# Patient Record
Sex: FEMALE | Race: BLACK OR AFRICAN AMERICAN | Employment: FULL TIME | URBAN - METROPOLITAN AREA
[De-identification: names, ages, dates, MRNs, and addresses within clinical notes are randomized per-mention and may not be internally consistent; named-entity substitution may affect disease eponyms.]

---

## 2017-12-08 ENCOUNTER — APPOINTMENT (OUTPATIENT)
Dept: CT IMAGING | Age: 32
End: 2017-12-08
Attending: EMERGENCY MEDICINE
Payer: SELF-PAY

## 2017-12-08 ENCOUNTER — HOSPITAL ENCOUNTER (EMERGENCY)
Age: 32
Discharge: HOME | End: 2017-12-08
Attending: EMERGENCY MEDICINE
Payer: SELF-PAY

## 2017-12-08 VITALS
RESPIRATION RATE: 18 BRPM | WEIGHT: 180 LBS | HEIGHT: 68 IN | BODY MASS INDEX: 27.28 KG/M2 | OXYGEN SATURATION: 100 % | DIASTOLIC BLOOD PRESSURE: 74 MMHG | HEART RATE: 78 BPM | SYSTOLIC BLOOD PRESSURE: 123 MMHG | TEMPERATURE: 98.3 F

## 2017-12-08 DIAGNOSIS — R10.84 ABDOMINAL PAIN, GENERALIZED: Primary | ICD-10-CM

## 2017-12-08 LAB
ALBUMIN SERPL-MCNC: 3.6 G/DL (ref 3.5–5)
ALBUMIN/GLOB SERPL: 0.8 {RATIO} (ref 1.2–3.5)
ALP SERPL-CCNC: 70 U/L (ref 50–136)
ALT SERPL-CCNC: 18 U/L (ref 12–65)
ANION GAP SERPL CALC-SCNC: 10 MMOL/L (ref 7–16)
AST SERPL-CCNC: 10 U/L (ref 15–37)
BASOPHILS # BLD: 0 K/UL (ref 0–0.2)
BASOPHILS NFR BLD: 0 % (ref 0–2)
BILIRUB SERPL-MCNC: 0.6 MG/DL (ref 0.2–1.1)
BUN SERPL-MCNC: 15 MG/DL (ref 6–23)
CALCIUM SERPL-MCNC: 9 MG/DL (ref 8.3–10.4)
CHLORIDE SERPL-SCNC: 104 MMOL/L (ref 98–107)
CO2 SERPL-SCNC: 26 MMOL/L (ref 21–32)
CREAT SERPL-MCNC: 0.8 MG/DL (ref 0.6–1)
DIFFERENTIAL METHOD BLD: ABNORMAL
EOSINOPHIL # BLD: 0.1 K/UL (ref 0–0.8)
EOSINOPHIL NFR BLD: 1 % (ref 0.5–7.8)
ERYTHROCYTE [DISTWIDTH] IN BLOOD BY AUTOMATED COUNT: 11.9 % (ref 11.9–14.6)
GLOBULIN SER CALC-MCNC: 4.5 G/DL (ref 2.3–3.5)
GLUCOSE SERPL-MCNC: 101 MG/DL (ref 65–100)
HCG UR QL: NEGATIVE
HCT VFR BLD AUTO: 35.8 % (ref 35.8–46.3)
HGB BLD-MCNC: 12 G/DL (ref 11.7–15.4)
IMM GRANULOCYTES # BLD: 0 K/UL (ref 0–0.5)
IMM GRANULOCYTES NFR BLD AUTO: 0 % (ref 0–5)
LIPASE SERPL-CCNC: 136 U/L (ref 73–393)
LYMPHOCYTES # BLD: 2.3 K/UL (ref 0.5–4.6)
LYMPHOCYTES NFR BLD: 23 % (ref 13–44)
MCH RBC QN AUTO: 31.4 PG (ref 26.1–32.9)
MCHC RBC AUTO-ENTMCNC: 33.5 G/DL (ref 31.4–35)
MCV RBC AUTO: 93.7 FL (ref 79.6–97.8)
MONOCYTES # BLD: 0.7 K/UL (ref 0.1–1.3)
MONOCYTES NFR BLD: 7 % (ref 4–12)
NEUTS SEG # BLD: 6.9 K/UL (ref 1.7–8.2)
NEUTS SEG NFR BLD: 69 % (ref 43–78)
PLATELET # BLD AUTO: 232 K/UL (ref 150–450)
PMV BLD AUTO: 8.8 FL (ref 10.8–14.1)
POTASSIUM SERPL-SCNC: 3.6 MMOL/L (ref 3.5–5.1)
PROT SERPL-MCNC: 8.1 G/DL (ref 6.3–8.2)
RBC # BLD AUTO: 3.82 M/UL (ref 4.05–5.25)
SODIUM SERPL-SCNC: 140 MMOL/L (ref 136–145)
WBC # BLD AUTO: 9.9 K/UL (ref 4.3–11.1)

## 2017-12-08 PROCEDURE — 74176 CT ABD & PELVIS W/O CONTRAST: CPT

## 2017-12-08 PROCEDURE — 83690 ASSAY OF LIPASE: CPT | Performed by: EMERGENCY MEDICINE

## 2017-12-08 PROCEDURE — 80053 COMPREHEN METABOLIC PANEL: CPT | Performed by: EMERGENCY MEDICINE

## 2017-12-08 PROCEDURE — 81003 URINALYSIS AUTO W/O SCOPE: CPT | Performed by: EMERGENCY MEDICINE

## 2017-12-08 PROCEDURE — 74011250637 HC RX REV CODE- 250/637: Performed by: EMERGENCY MEDICINE

## 2017-12-08 PROCEDURE — 74011250636 HC RX REV CODE- 250/636: Performed by: EMERGENCY MEDICINE

## 2017-12-08 PROCEDURE — 85025 COMPLETE CBC W/AUTO DIFF WBC: CPT | Performed by: EMERGENCY MEDICINE

## 2017-12-08 PROCEDURE — 96374 THER/PROPH/DIAG INJ IV PUSH: CPT | Performed by: EMERGENCY MEDICINE

## 2017-12-08 PROCEDURE — 81025 URINE PREGNANCY TEST: CPT

## 2017-12-08 PROCEDURE — 99284 EMERGENCY DEPT VISIT MOD MDM: CPT | Performed by: EMERGENCY MEDICINE

## 2017-12-08 RX ORDER — ONDANSETRON 4 MG/1
4 TABLET, ORALLY DISINTEGRATING ORAL
Qty: 5 TAB | Refills: 0 | Status: SHIPPED | OUTPATIENT
Start: 2017-12-08 | End: 2018-05-02

## 2017-12-08 RX ORDER — METRONIDAZOLE 500 MG/1
500 TABLET ORAL 3 TIMES DAILY
Qty: 21 TAB | Refills: 0 | Status: SHIPPED | OUTPATIENT
Start: 2017-12-08 | End: 2017-12-15

## 2017-12-08 RX ORDER — CIPROFLOXACIN 500 MG/1
500 TABLET ORAL 2 TIMES DAILY
Qty: 14 TAB | Refills: 0 | Status: SHIPPED | OUTPATIENT
Start: 2017-12-08 | End: 2017-12-15

## 2017-12-08 RX ORDER — CIPROFLOXACIN 500 MG/1
500 TABLET ORAL ONCE
Status: COMPLETED | OUTPATIENT
Start: 2017-12-08 | End: 2017-12-08

## 2017-12-08 RX ORDER — ONDANSETRON 2 MG/ML
4 INJECTION INTRAMUSCULAR; INTRAVENOUS
Status: COMPLETED | OUTPATIENT
Start: 2017-12-08 | End: 2017-12-08

## 2017-12-08 RX ORDER — METRONIDAZOLE 500 MG/1
500 TABLET ORAL ONCE
Status: COMPLETED | OUTPATIENT
Start: 2017-12-08 | End: 2017-12-08

## 2017-12-08 RX ADMIN — METRONIDAZOLE 500 MG: 500 TABLET ORAL at 16:58

## 2017-12-08 RX ADMIN — CIPROFLOXACIN HYDROCHLORIDE 500 MG: 500 TABLET, FILM COATED ORAL at 16:58

## 2017-12-08 RX ADMIN — ONDANSETRON 4 MG: 2 INJECTION INTRAMUSCULAR; INTRAVENOUS at 16:58

## 2017-12-08 NOTE — LETTER
[facility] [place] [address] [address] [phone] Work/School Note Date: 12/8/2017 To Whom It May concern: 
 
Tenzin Yeboah was seen and treated today in the emergency room by the following provider(s): 
No providers found. Tenzin Yeboah may return to work on 12/12/2017.  
 
Sincerely, 
 
 
 
 
Anival Ennis RN

## 2017-12-08 NOTE — DISCHARGE INSTRUCTIONS
Diverticulitis: Care Instructions  Your Care Instructions    Diverticulitis occurs when pouches form in the wall of the colon and become inflamed or infected. It can be very painful. Doctors aren't sure what causes diverticulitis. There is no proof that foods such as nuts, seeds, or berries cause it or make it worse. A low-fiber diet may cause the colon to work harder to push stool forward. Pouches may form because of this extra work. It may be hard to think about healthy eating while you're in pain. But as you recover, you might think about how you can use healthy eating for overall better health. Healthy eating may help you avoid future attacks. Follow-up care is a key part of your treatment and safety. Be sure to make and go to all appointments, and call your doctor if you are having problems. It's also a good idea to know your test results and keep a list of the medicines you take. How can you care for yourself at home? · Drink plenty of fluids, enough so that your urine is light yellow or clear like water. If you have kidney, heart, or liver disease and have to limit fluids, talk with your doctor before you increase the amount of fluids you drink. · Stick to liquids or a bland diet (plain rice, bananas, dry toast or crackers, applesauce) until you are feeling better. Then you can return to regular foods and gradually increase the amount of fiber in your diet. · Use a heating pad set on low on your belly to relieve mild cramps and pain. · Get extra rest until you are feeling better. · Be safe with medicines. Read and follow all instructions on the label. ¨ If the doctor gave you a prescription medicine for pain, take it as prescribed. ¨ If you are not taking a prescription pain medicine, ask your doctor if you can take an over-the-counter medicine. · If your doctor prescribed antibiotics, take them as directed. Do not stop taking them just because you feel better.  You need to take the full course of antibiotics. To prevent future attacks of diverticulitis  · Avoid constipation:  ¨ Include fruits, vegetables, beans, and whole grains in your diet each day. These foods are high in fiber. ¨ Drink plenty of fluids, enough so that your urine is light yellow or clear like water. If you have kidney, heart, or liver disease and have to limit fluids, talk with your doctor before you increase the amount of fluids you drink. ¨ Get some exercise every day. Build up slowly to 30 to 60 minutes a day on 5 or more days of the week. ¨ Take a fiber supplement, such as Citrucel or Metamucil, every day if needed. Read and follow all instructions on the label. ¨ Schedule time each day for a bowel movement. Having a daily routine may help. Take your time and do not strain when having a bowel movement. When should you call for help? Call your doctor now or seek immediate medical care if:  ? · You have a fever. ? · You are vomiting. ? · You have new or worse belly pain. ? · You cannot pass stools or gas. ? Watch closely for changes in your health, and be sure to contact your doctor if you have any problems. Where can you learn more? Go to http://chanda-laith.info/. Enter H901 in the search box to learn more about \"Diverticulitis: Care Instructions. \"  Current as of: May 12, 2017  Content Version: 11.4  © 0399-9800 [employer] Care instructions adapted under license by [employer] (which disclaims liability or warranty for this information). If you have questions about a medical condition or this instruction, always ask your healthcare professional. [place] [address] any warranty or liability for your use of this information. Ciprofloxacin (By mouth)   Ciprofloxacin (yap-ivg-NQDB-a-sin)  Treats infections and plague. This medicine is a quinolone antibiotic. Brand Name(s): Cipro   There may be other brand names for this medicine.   When This Medicine Should Not Be Used: This medicine is not right for everyone. Do not use it if you had an allergic reaction to ciprofloxacin or to similar medicines. How to Use This Medicine:   Liquid, Tablet, Long Acting Tablet  · Your doctor will tell you how much medicine to use. Do not use more than directed. Take this medicine at the same time each day. · You may take this medicine with or without food. Do not take this medicine with only a source of calcium, including milk, yogurt, or juice that contains added calcium. You may have foods or drinks that contain calcium as part of a larger meal.  · Swallow the extended-release tablet whole. Do not crush, break, or chew it. · Oral liquid: Shake for at least 15 seconds just before each use. The liquid has small beads floating in it. Do not chew the beads when you drink the liquid. Measure the oral liquid medicine with a marked measuring spoon, oral syringe, or medicine cup. · Tablet: Swallow whole. Do not break, crush, or chew it. · Drink extra fluids so you will urinate more often and help prevent kidney problems. · Take all of the medicine in your prescription to clear up your infection, even if you feel better after the first few doses. · This medicine should come with a Medication Guide. Ask your pharmacist for a copy if you do not have one. · Missed dose: Take a dose as soon as you remember. If it is almost time for your next dose, wait until then and take a regular dose. Do not take extra medicine to make up for a missed dose. · Store the medicine in a closed container at room temperature, away from heat, moisture, and direct light. Throw away any leftover liquid medicine after 14 days. Drugs and Foods to Avoid:   Ask your doctor or pharmacist before using any other medicine, including over-the-counter medicines, vitamins, and herbal products. · Do not use this medicine together with tizanidine. · Some foods and medicines can affect how ciprofloxacin works. Tell your doctor if you are using any of the following:  ¨ Clozapine, cyclosporine, duloxetine, lidocaine, methotrexate, olanzapine, pentoxifylline, phenytoin, probenecid, ropinirole, sildenafil, theophylline, zolpidem  ¨ Antibiotic (including azithromycin, clarithromycin, erythromycin)  ¨ Blood thinner (including warfarin)  ¨ Diabetes medicine (including glimepiride, glyburide)  ¨ Medicine for depression or mental illness  ¨ Medicine for heart rhythm problems (including amiodarone, procainamide, quinidine, sotalol)  ¨ NSAID pain medicine (including aspirin, celecoxib, diclofenac, ibuprofen, naproxen)  ¨ Steroid medicine (including hydrocortisone, methylprednisolone, prednisone)  · Take ciprofloxacin at least 2 hours before or 6 hours after you take didanosine buffered tablets for oral suspension or the pediatric powder for oral suspension, sucralfate, or antacids, multivitamins, or other products containing aluminum, magnesium, lanthanum, sevelamer, iron, or zinc.  · This medicine slows the digestion of caffeine, so it might affect you for longer than normal.  Warnings While Using This Medicine:   · Tell your doctor if you are pregnant or breastfeeding, or if you have kidney disease, liver disease, diabetes, heart disease, myasthenia gravis, or a history of heart rhythm problems (including prolonged QT interval), nerve problems, or seizures. Tell your doctor if you have ever had tendon or joint problems, including rheumatoid arthritis, or if you have received a transplant. · This medicine may cause the following problems:  ¨ Tendinitis and tendon rupture (which may happen after treatment ends)  ¨ Liver damage  ¨ Nerve damage in the arms or legs  ¨ Heart rhythm changes  ¨ Changes in blood sugar levels  · This medicine may make you dizzy, drowsy, or lightheaded. Do not drive or do anything else that could be dangerous until you know how this medicine affects you. · This medicine can cause diarrhea.  Call your doctor if the diarrhea becomes severe, does not stop, or is bloody. Do not take any medicine to stop diarrhea until you have talked to your doctor. Diarrhea can occur 2 months or more after you stop taking this medicine. · This medicine may make your skin more sensitive to sunlight. Wear sunscreen. Do not use sunlamps or tanning beds. · Call your doctor if your symptoms do not improve or if they get worse. · Keep all medicine out of the reach of children. Never share your medicine with anyone. Possible Side Effects While Using This Medicine:   Call your doctor right away if you notice any of these side effects:  · Allergic reaction: Itching or hives, swelling in your face or hands, swelling or tingling in your mouth or throat, chest tightness, trouble breathing  · Blistering, peeling, red skin rash  · Dark urine, pale stools, nausea, vomiting, loss of appetite, stomach pain, yellow skin or eyes  · Diarrhea which may contain blood  · Fainting, dizziness, or lightheadedness  · Fast, slow, or uneven heartbeat  · Numbness, tingling, weakness, or burning pain in your hands, arms, legs, or feet  · Pain, stiffness, swelling, or bruises around your ankle, leg, shoulder, or other joints  · Seizures, severe headache, unusual thoughts or behaviors, trouble sleeping, feeling anxious, confused, or depressed, seeing, hearing, or feeling things that are not there  · Unusual bleeding, bruising, or weakness  If you notice other side effects that you think are caused by this medicine, tell your doctor. Call your doctor for medical advice about side effects. You may report side effects to FDA at 5-673-FDA-0715  © 2017 [facility] Information is for End User's use only and may not be sold, redistributed or otherwise used for commercial purposes. The above information is an  only. It is not intended as medical advice for individual conditions or treatments.  Talk to your doctor, nurse or pharmacist before following any medical regimen to see if it is safe and effective for you. Metronidazole (Flagyl, Flagyl 375, Flagyl ER) - (By mouth)   Why this medicine is used:   Treats bacterial infections. Contact a nurse or doctor right away if you have:  · Confusion, drowsiness, clumsiness, trouble talking  · Seizures  · Fever, headache, loss of appetite, nausea or vomiting  · Dizziness, problems with muscle control, stiff neck or back, shakiness  · Numbness, tingling, or burning pain in your hands, arms, legs, or feet     Common side effects:  · Vaginal itching, vaginal yeast infection (women)  · Nausea, stomach discomfort, unusual or unpleasant taste in your mouth  · Headache, rash  © 2017 [address] is for End User's use only and may not be sold, redistributed or otherwise used for commercial purposes. Ondansetron (By mouth, Into the mouth)   Ondansetron (on-DAN-se-dolores)  Prevents nausea and vomiting. Brand Name(s): Zofran, Zofran ODT, Zuplenz   There may be other brand names for this medicine. When This Medicine Should Not Be Used: This medicine is not right for everyone. Do not use it if you had an allergic reaction to ondansetron. How to Use This Medicine: Thin Sheet, Liquid, Tablet, Dissolving Tablet  · Your doctor will tell you how much medicine to use. Do not use more than directed. · Measure the oral liquid medicine with a marked measuring spoon, oral syringe, or medicine cup. · To use the disintegrating tablet:   ¨ Do not open the blister pack that contains the tablet until you are ready to take it. ¨ Make sure your hands are dry. Peel back the foil, then remove the tablet from the blister pack. Do not push the tablet through the foil. ¨ Place the tablet on top of your tongue where it will dissolve in seconds. After the tablet has melted, swallow or take a sip of water. · To use the soluble film:   ¨ Make sure your hands are clean and dry.   ¨ Fold the pouch along the dotted line. ¨ While still folded, tear the pouch carefully along the edge. Remove the film from the pouch. ¨ Place the film on top of your tongue. It will dissolve in 4 to 20 seconds. Do not chew or swallow the film whole. ¨ After the film has dissolved, you may swallow with or without water. · Read and follow the patient instructions that come with this medicine. Talk to your doctor or pharmacist if you have any questions. · Missed dose: Take a dose as soon as you remember. If it is almost time for your next dose, wait until then and take a regular dose. Do not take extra medicine to make up for a missed dose. · Store the medicine in a closed container at room temperature, away from heat, moisture, and direct light. Keep the soluble film in the foil pouch until you ready to use it. Drugs and Foods to Avoid:   Ask your doctor or pharmacist before using any other medicine, including over-the-counter medicines, vitamins, and herbal products. · Do not use this medicine together with apomorphine. · Some medicines may affect how ondansetron works. Tell your doctor if you are using tramadol, diuretics (water pills), or any other medicine for nausea and vomiting. Warnings While Using This Medicine:   · Tell your doctor if you are pregnant or breastfeeding, or if you have liver disease, congestive heart failure, heart rhythm problems (such as prolonged QT interval, slow heartbeat), low magnesium or potassium levels, stomach or bowel problems, or phenylketonuria (PKU). · This medicine may cause heart rhythm problems (such as QT prolongation). · This medicine may make you dizzy. Do not drive or do anything else that could be dangerous until you know how this medicine affects you. · Keep all medicine out of the reach of children. Never share your medicine with anyone.   Possible Side Effects While Using This Medicine:   Call your doctor right away if you notice any of these side effects:  · Allergic reaction: Itching or hives, swelling in your face or hands, swelling or tingling in your mouth or throat, chest tightness, trouble breathing  · Fainting, dizziness, or lightheadedness  · Fast, pounding, or uneven heartbeat  · Trouble breathing  If you notice these less serious side effects, talk with your doctor:   · Constipation or diarrhea  · Headache  · Tiredness or weakness  If you notice other side effects that you think are caused by this medicine, tell your doctor. Call your doctor for medical advice about side effects. You may report side effects to FDA at 7-708-HOI-4051  © 2017 [facility] Information is for End User's use only and may not be sold, redistributed or otherwise used for commercial purposes. The above information is an  only. It is not intended as medical advice for individual conditions or treatments. Talk to your doctor, nurse or pharmacist before following any medical regimen to see if it is safe and effective for you.

## 2017-12-08 NOTE — ED PROVIDER NOTES
HPI Comments: Patient is a 26yo female with abdominal pain. States the pain is worst in the lower abdomen, mostly on the left, states history of diverticulosis. She denies any pain with urination on my history. States seems to get worse at nights and has been present for approximately 5 days. States no vaginal bleeding or discharge. Also states \"colder\" than usual and difficulty warming up at night. States also intermittent headaches for two weeks. Patient is a 33 y.o. female presenting with abdominal pain. The history is provided by the patient. No  was used. Abdominal Pain    Associated symptoms include headaches. Pertinent negatives include no fever, no diarrhea, no nausea, no vomiting, no dysuria, no chest pain and no back pain. Past Medical History:   Diagnosis Date    Diverticulosis        Past Surgical History:   Procedure Laterality Date    BREAST SURGERY PROCEDURE UNLISTED           History reviewed. No pertinent family history. Social History     Social History    Marital status: SINGLE     Spouse name: N/A    Number of children: N/A    Years of education: N/A     Occupational History    Not on file. Social History Main Topics    Smoking status: Never Smoker    Smokeless tobacco: Never Used    Alcohol use No    Drug use: No    Sexual activity: Not on file     Other Topics Concern    Not on file     Social History Narrative    No narrative on file         ALLERGIES: Review of patient's allergies indicates no known allergies. Review of Systems   Constitutional: Positive for chills. Negative for fever. HENT: Negative for rhinorrhea and sore throat. Eyes: Negative for visual disturbance. Respiratory: Negative for cough and shortness of breath. Cardiovascular: Negative for chest pain and leg swelling. Gastrointestinal: Positive for abdominal pain. Negative for diarrhea, nausea and vomiting. Genitourinary: Negative for dysuria. Musculoskeletal: Negative for back pain and neck pain. Skin: Negative for rash. Neurological: Positive for headaches. Negative for weakness. Psychiatric/Behavioral: The patient is not nervous/anxious. Vitals:    12/08/17 1353   BP: 126/78   Pulse: 92   Resp: 26   Temp: 98.3 °F (36.8 °C)   SpO2: 100%   Weight: 81.6 kg (180 lb)   Height: 5' 8\" (1.727 m)            Physical Exam   Constitutional: She is oriented to person, place, and time. She appears well-developed and well-nourished. HENT:   Head: Normocephalic. Right Ear: External ear normal.   Left Ear: External ear normal.   Eyes: Conjunctivae and EOM are normal. Pupils are equal, round, and reactive to light. Neck: Normal range of motion. Neck supple. No tracheal deviation present. Cardiovascular: Normal rate, regular rhythm, normal heart sounds and intact distal pulses. No murmur heard. Pulmonary/Chest: Effort normal and breath sounds normal. No respiratory distress. Abdominal: Soft. There is tenderness (Minimal tenderness to palpation in LLQ. Positive CVA tenderness on left. Non-tender in RLQ or through-out otherwise). Musculoskeletal: Normal range of motion. Neurological: She is alert and oriented to person, place, and time. No cranial nerve deficit. Skin: No rash noted. Nursing note and vitals reviewed.        MDM  Number of Diagnoses or Management Options  Abdominal pain, generalized: new and requires workup     Amount and/or Complexity of Data Reviewed  Clinical lab tests: ordered and reviewed  Tests in the radiology section of CPT®: ordered and reviewed  Tests in the medicine section of CPT®: ordered and reviewed  Review and summarize past medical records: yes    Risk of Complications, Morbidity, and/or Mortality  Presenting problems: high  Diagnostic procedures: high  Management options: high    Patient Progress  Patient progress: stable    ED Course       Procedures     Recent Results (from the past 12 hour(s))   CBC WITH AUTOMATED DIFF    Collection Time: 12/08/17  1:58 PM   Result Value Ref Range    WBC 9.9 4.3 - 11.1 K/uL    RBC 3.82 (L) 4.05 - 5.25 M/uL    HGB 12.0 11.7 - 15.4 g/dL    HCT 35.8 35.8 - 46.3 %    MCV 93.7 79.6 - 97.8 FL    MCH 31.4 26.1 - 32.9 PG    MCHC 33.5 31.4 - 35.0 g/dL    RDW 11.9 11.9 - 14.6 %    PLATELET 933 889 - 791 K/uL    MPV 8.8 (L) 10.8 - 14.1 FL    DF AUTOMATED      NEUTROPHILS 69 43 - 78 %    LYMPHOCYTES 23 13 - 44 %    MONOCYTES 7 4.0 - 12.0 %    EOSINOPHILS 1 0.5 - 7.8 %    BASOPHILS 0 0.0 - 2.0 %    IMMATURE GRANULOCYTES 0 0.0 - 5.0 %    ABS. NEUTROPHILS 6.9 1.7 - 8.2 K/UL    ABS. LYMPHOCYTES 2.3 0.5 - 4.6 K/UL    ABS. MONOCYTES 0.7 0.1 - 1.3 K/UL    ABS. EOSINOPHILS 0.1 0.0 - 0.8 K/UL    ABS. BASOPHILS 0.0 0.0 - 0.2 K/UL    ABS. IMM. GRANS. 0.0 0.0 - 0.5 K/UL   METABOLIC PANEL, COMPREHENSIVE    Collection Time: 12/08/17  1:58 PM   Result Value Ref Range    Sodium 140 136 - 145 mmol/L    Potassium 3.6 3.5 - 5.1 mmol/L    Chloride 104 98 - 107 mmol/L    CO2 26 21 - 32 mmol/L    Anion gap 10 7 - 16 mmol/L    Glucose 101 (H) 65 - 100 mg/dL    BUN 15 6 - 23 MG/DL    Creatinine 0.80 0.6 - 1.0 MG/DL    GFR est AA >60 >60 ml/min/1.73m2    GFR est non-AA >60 >60 ml/min/1.73m2    Calcium 9.0 8.3 - 10.4 MG/DL    Bilirubin, total 0.6 0.2 - 1.1 MG/DL    ALT (SGPT) 18 12 - 65 U/L    AST (SGOT) 10 (L) 15 - 37 U/L    Alk.  phosphatase 70 50 - 136 U/L    Protein, total 8.1 6.3 - 8.2 g/dL    Albumin 3.6 3.5 - 5.0 g/dL    Globulin 4.5 (H) 2.3 - 3.5 g/dL    A-G Ratio 0.8 (L) 1.2 - 3.5     LIPASE    Collection Time: 12/08/17  1:58 PM   Result Value Ref Range    Lipase 136 73 - 393 U/L   HCG URINE, QL. - POC    Collection Time: 12/08/17  2:51 PM   Result Value Ref Range    Pregnancy test,urine (POC) NEGATIVE  NEG       Ct Urogram Wo Cont    Result Date: 12/8/2017  CT abdomen and pelvis INDICATION:   Left flank pain and hematuria for several days Multiple axial images were obtained through the abdomen and pelvis without intravenous or oral contrast.  Radiation dose reduction techniques were used for this study: All CT scans performed at this facility use one or all of the following: Automated exposure control, adjustment of the mA and/or kVp according to patient's size, iterative reconstruction. Findings: There are no renal calculi. There is no hydronephrosis. There is no evidence of renal mass. There are no stones in the ureters or bladder. The lung bases are clear. No discrete lesions are seen in the visualized portions of the liver or spleen. There are no adrenal or pancreas masses. The appendix is normal in size. There is inflammation of the distal descending colon, most likely acute diverticulitis. A few bubbles of air adjacent to the colon are likely within diverticuli. There is no abscess. There is no free fluid in the pelvis. There is no significant adenopathy. There are no bony lesions. IMPRESSION: Acute distal descending colon diverticulitis        34 yo female with abdominal and flank pain:         Diverticulitis without abscess or evidence of perforation. Will give first dose abx in ED and rx cipro/flagyl for home. Patient is VERY well appearing, NAD, VSS, Labs reassuring and feel patient appropriate for outpatient therapy. Will give follow up with GI and strict return for worsening pain, any nausea or vomiting, any fevers or chills or any further concerns.

## 2017-12-08 NOTE — ED TRIAGE NOTES
Pt reports lower abdominal pain, pelvic pain, headaches and feeling cold for the past week.  Pt states more frequent urination than normal

## 2018-02-20 ENCOUNTER — HOSPITAL ENCOUNTER (EMERGENCY)
Age: 33
Discharge: HOME | End: 2018-02-20
Attending: EMERGENCY MEDICINE
Payer: COMMERCIAL

## 2018-02-20 VITALS
RESPIRATION RATE: 18 BRPM | OXYGEN SATURATION: 98 % | WEIGHT: 180 LBS | BODY MASS INDEX: 27.28 KG/M2 | TEMPERATURE: 98.6 F | DIASTOLIC BLOOD PRESSURE: 75 MMHG | HEIGHT: 68 IN | SYSTOLIC BLOOD PRESSURE: 128 MMHG | HEART RATE: 82 BPM

## 2018-02-20 DIAGNOSIS — E16.2 HYPOGLYCEMIA: ICD-10-CM

## 2018-02-20 DIAGNOSIS — R55 SYNCOPE AND COLLAPSE: Primary | ICD-10-CM

## 2018-02-20 LAB — GLUCOSE BLD STRIP.AUTO-MCNC: 83 MG/DL (ref 65–100)

## 2018-02-20 PROCEDURE — 99284 EMERGENCY DEPT VISIT MOD MDM: CPT | Performed by: EMERGENCY MEDICINE

## 2018-02-20 PROCEDURE — 81003 URINALYSIS AUTO W/O SCOPE: CPT | Performed by: EMERGENCY MEDICINE

## 2018-02-20 PROCEDURE — 82962 GLUCOSE BLOOD TEST: CPT

## 2018-02-20 NOTE — DISCHARGE INSTRUCTIONS
Go straight to the cafeteria and get some lunch, something solid with some meat and perhaps vegetables    Try to eat something more substantial each morning the just fruit fruit juice           Hypoglycemia: Care Instructions  Your Care Instructions    Hypoglycemia means that your blood sugar is low and your body is not getting enough fuel. Some people get low blood sugar from not eating often enough. Some medicines to treat diabetes can cause low blood sugar. People who have had surgery on their stomachs or intestines may get hypoglycemia. Problems with the pancreas, kidneys, or liver also can cause low blood sugar. A snack or drink with sugar in it will raise your blood sugar and should ease your symptoms right away. Your doctor may recommend that you change or stop your medicines until you can get your blood sugar levels under control. In the long run, you may need to change your diet and eating habits so that you get enough fuel for your body throughout the day. Follow-up care is a key part of your treatment and safety. Be sure to make and go to all appointments, and call your doctor if you are having problems. It's also a good idea to know your test results and keep a list of the medicines you take. How can you care for yourself at home? · Learn to recognize the early signs of low blood sugar. Signs include:  ¨ Nausea. ¨ Hunger. ¨ Feeling nervous, irritable, or shaky. ¨ Cold, clammy, wet skin. ¨ Sweating (when you are not exercising). ¨ A fast heartbeat. ¨ Numbness or tingling of the fingertips or lips. · If you feel an episode of low blood sugar coming on, drink fruit juice or sugared (not diet) soda, or eat sugar in the form of candy, cubes, or tablets. [employer] are another [employer]. · Eat small, frequent meals so that you do not get too hungry between meals. · Balance extra exercise with eating more.   · Keep a written record of your low blood sugar episodes, including when you last ate and what you ate, so that you can learn what causes your blood sugar to drop. · Make sure your family, friends, and coworkers know the symptoms of low blood sugar and know what to do to get your sugar level up. · Wear medical alert jewelry that lists your condition. You can buy this at most drugstores. When should you call for help? Call 911 anytime you think you may need emergency care. For example, call if:  ? · You passed out (lost consciousness). ? · You are confused or cannot think clearly. ? · Your blood sugar is very high or very low. ? Watch closely for changes in your health, and be sure to contact your doctor if:  ? · Your blood sugar stays outside the level your doctor set for you. ? · You have any problems. Where can you learn more? Go to http://chanda-laith.info/. Enter F239 in the search box to learn more about \"Hypoglycemia: Care Instructions. \"  Current as of: March 13, 2017  Content Version: 11.4  © 5924-4048 [employer]. Care instructions adapted under license by [employer] (which disclaims liability or warranty for this information). If you have questions about a medical condition or this instruction, always ask your healthcare professional. [place] [address] any warranty or liability for your use of this information. Fainting: Care Instructions  Your Care Instructions    When you faint, or pass out, you lose consciousness for a short time. A brief drop in blood flow to the brain often causes it. When you fall or lie down, more blood flows to your brain and you regain consciousness. Emotional stress, pain, or overheating-especially if you have been standing-can make you faint. In these cases, fainting is usually not serious. But fainting can be a sign of a more serious problem. Your doctor may want you to have more tests to rule out other causes. The treatment you need depends on the reason why you fainted.   The doctor has checked you carefully, but problems can develop later. If you notice any problems or new symptoms, get medical treatment right away. Follow-up care is a key part of your treatment and safety. Be sure to make and go to all appointments, and call your doctor if you are having problems. It's also a good idea to know your test results and keep a list of the medicines you take. How can you care for yourself at home? · Drink plenty of fluids to prevent dehydration. If you have kidney, heart, or liver disease and have to limit fluids, talk with your doctor before you increase your fluid intake. When should you call for help? Call 911 anytime you think you may need emergency care. For example, call if:  ? · You have symptoms of a heart problem. These may include:  ¨ Chest pain or pressure. ¨ Severe trouble breathing. ¨ A fast or irregular heartbeat. ¨ Lightheadedness or sudden weakness. ¨ Coughing up pink, foamy mucus. ¨ Passing out. After you call 911, the  may tell you to chew 1 adult-strength or 2 to 4 low-dose aspirin. Wait for an ambulance. Do not try to drive yourself. ? · You have symptoms of a stroke. These may include:  ¨ Sudden numbness, tingling, weakness, or loss of movement in your face, arm, or leg, especially on only one side of your body. ¨ Sudden vision changes. ¨ Sudden trouble speaking. ¨ Sudden confusion or trouble understanding simple statements. ¨ Sudden problems with walking or balance. ¨ A sudden, severe headache that is different from past headaches. ? · You passed out (lost consciousness) again. ? Watch closely for changes in your health, and be sure to contact your doctor if:  ? · You do not get better as expected. Where can you learn more? Go to http://chanda-laith.info/. Enter C624 in the search box to learn more about \"Fainting: Care Instructions. \"  Current as of: March 20, 2017  Content Version: 11.4  © 8748-3066 [employer]. Care instructions adapted under license by [employer] (which disclaims liability or warranty for this information). If you have questions about a medical condition or this instruction, always ask your healthcare professional. [address] any warranty or liability for your use of this information. Lightheadedness or Faintness: Care Instructions  Your Care Instructions  Lightheadedness is a feeling that you are about to faint or \"pass out. \" You do not feel as if you or your surroundings are moving. It is different from vertigo, which is the feeling that you or things around you are spinning or tilting. Lightheadedness usually goes away or gets better when you lie down. If lightheadedness gets worse, it can lead to a fainting spell. It is common to feel lightheaded from time to time. Lightheadedness usually is not caused by a serious problem. It often is caused by a short-lasting drop in blood pressure and blood flow to your head that occurs when you get up too quickly from a seated or lying position. Follow-up care is a key part of your treatment and safety. Be sure to make and go to all appointments, and call your doctor if you are having problems. It's also a good idea to know your test results and keep a list of the medicines you take. How can you care for yourself at home? · Lie down for 1 or 2 minutes when you feel lightheaded. After lying down, sit up slowly and remain sitting for 1 to 2 minutes before slowly standing up. · Avoid movements, positions, or activities that have made you lightheaded in the past.  · Get plenty of rest, especially if you have a cold or flu, which can cause lightheadedness. · Make sure you drink plenty of fluids, especially if you have a fever or have been sweating. · Do not drive or put yourself and others in danger while you feel lightheaded. When should you call for help?   Call 911 anytime you think you may need emergency care. For example, call if:  ? · You have symptoms of a stroke. These may include:  ¨ Sudden numbness, tingling, weakness, or loss of movement in your face, arm, or leg, especially on only one side of your body. ¨ Sudden vision changes. ¨ Sudden trouble speaking. ¨ Sudden confusion or trouble understanding simple statements. ¨ Sudden problems with walking or balance. ¨ A sudden, severe headache that is different from past headaches. ? · You have symptoms of a heart attack. These may include:  ¨ Chest pain or pressure, or a strange feeling in the chest.  ¨ Sweating. ¨ Shortness of breath. ¨ Nausea or vomiting. ¨ Pain, pressure, or a strange feeling in the back, neck, jaw, or upper belly or in one or both shoulders or arms. ¨ Lightheadedness or sudden weakness. ¨ A fast or irregular heartbeat. After you call 911, the  may tell you to chew 1 adult-strength or 2 to 4 low-dose aspirin. Wait for an ambulance. Do not try to drive yourself. ? Watch closely for changes in your health, and be sure to contact your doctor if:  ? · Your lightheadedness gets worse or does not get better with home care. Where can you learn more? Go to http://chanda-laith.info/. Enter Y378 in the search box to learn more about \"Lightheadedness or Faintness: Care Instructions. \"  Current as of: March 20, 2017  Content Version: 11.4  © 5998-2347 [employer]. Care instructions adapted under license by [employer] (which disclaims liability or warranty for this information). If you have questions about a medical condition or this instruction, always ask your healthcare professional. [place] [address] any warranty or liability for your use of this information.

## 2018-02-20 NOTE — ED PROVIDER NOTES
HPI Comments: Patient with dizzy spell  Onset between 8 and 9 this morning. He had juice and a banana at 445 which is not uncommon. 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 She then began to feel shaky and hot and flushed. She had been eating some candies to try to get her through,  until she ate a more substantial breakfast a little bit later in the morning (which is her usual practice). Patient tried to get to the bathroom to put some cold water in her face  But actually did collapse to the floor contrary to the nursing note. Her blood sugar of 83 is untreated, she does feel better than earlier. At the time that sugar was drawn at triage she had only had the juice and banana, and then some candies to snack on. Patient is a 33 y.o. female presenting with dizziness. The history is provided by the patient. Dizziness   This is a new problem. The current episode started 1 to 2 hours ago. The problem has not changed since onset. There was no focality noted. Primary symptoms include loss of balance. Primary symptoms comment: diaphoresis and tremulousness patient very hot and flushed. Pertinent negatives include no shortness of breath, no chest pain, no vomiting, no headaches and no nausea. Past Medical History:   Diagnosis Date    Diverticulosis        Past Surgical History:   Procedure Laterality Date    BREAST SURGERY PROCEDURE UNLISTED           History reviewed. No pertinent family history. Social History     Social History    Marital status: SINGLE     Spouse name: N/A    Number of children: N/A    Years of education: N/A     Occupational History    Not on file. Social History Main Topics    Smoking status: Never Smoker    Smokeless tobacco: Never Used    Alcohol use No    Drug use: No    Sexual activity: Not on file     Other Topics Concern    Not on file     Social History Narrative         ALLERGIES: Review of patient's allergies indicates no known allergies.     Review of Systems Constitutional: Positive for activity change, diaphoresis and fatigue. Negative for chills and fever. HENT: Negative for rhinorrhea and sore throat. Eyes: Negative for discharge and redness. Respiratory: Negative for cough and shortness of breath. Cardiovascular: Negative for chest pain. Gastrointestinal: Negative for abdominal pain, nausea and vomiting. Musculoskeletal: Negative for arthralgias and back pain. Skin: Negative for rash. Neurological: Positive for dizziness, tremors, syncope, light-headedness and loss of balance. Negative for headaches. All other systems reviewed and are negative. Vitals:    02/20/18 1018   BP: 137/64   Pulse: 78   Resp: 18   Temp: 98.6 °F (37 °C)   SpO2: 98%   Weight: 81.6 kg (180 lb)   Height: 5' 8\" (1.727 m)            Physical Exam   Constitutional: She is oriented to person, place, and time. She appears well-developed and well-nourished. No distress. HENT:   Head: Normocephalic and atraumatic. Eyes: Conjunctivae are normal. Pupils are equal, round, and reactive to light. Right eye exhibits no discharge. Left eye exhibits no discharge. No scleral icterus. Neck: Normal range of motion. Neck supple. Cardiovascular: Normal rate, regular rhythm and normal heart sounds. Exam reveals no gallop. No murmur heard. Pulmonary/Chest: Effort normal and breath sounds normal. No respiratory distress. She has no wheezes. She has no rales. Abdominal: Soft. Bowel sounds are normal. There is no tenderness. There is no guarding. Musculoskeletal: Normal range of motion. She exhibits no edema. Neurological: She is alert and oriented to person, place, and time. She exhibits normal muscle tone. cni 2-12 grossly   Skin: Skin is warm and dry. She is not diaphoretic. Psychiatric: She has a normal mood and affect. Her behavior is normal.   Nursing note and vitals reviewed.        MDM  Number of Diagnoses or Management Options  Diagnosis management comments: Medical decision making note:  Dizzy spell is syncope patient hot flush palpitations. Had a very sugar intensive early breakfast with nothing more solid to follow it up. No documented hypoglycemia, other than her triage fingerstick of 83. I suspect patient had a hypoglycemic spell and is mostly recovered currently. Patient had a previous hypoglycemic spell in which she fell striking her head she felt very similar this morning to that them. Eyes diabetes does states she is \"borderline\"  This concludes the \"medical decision making note\" part of this emergency department visit note.           ED Course       Procedures

## 2018-02-20 NOTE — ED TRIAGE NOTES
Pt states while at work (pt is a  at [employer]) she became dizzy and flushed. Pt states she ate a banana and drank orange juice this morning. Pt states similar episode in past when having low BGL. Pt denies syncopal episode. Pt is alert and oriented x 4. Respirations are even and unlabored. BGL 83 in triage.

## 2018-02-20 NOTE — ED NOTES
I have reviewed discharge instructions with the patient. The patient verbalized understanding. Patient left ED via Discharge Method: ambulatory to Home with (insert name of family/friend, self). Opportunity for questions and clarification provided. Patient given 0 scripts. To continue your aftercare when you leave the hospital, you may receive an automated call from our care team to check in on how you are doing. This is a free service and part of our promise to provide the best care and service to meet your aftercare needs.  If you have questions, or wish to unsubscribe from this service please call [phone]. Thank you for Choosing our [facility] Emergency Department.

## 2018-02-20 NOTE — ED NOTES
Still awaiting a sandwich or something solid but in her stomach after 2 hours,  Patient up and about restroom and feeling okay orthostatics are negative  We'll discharge her and direct her to the cafeteria by way of elevator so she can get something to eat    Patient encouraged to eat something more substantial in the mornings than just fruit and fruit juice which is very sugary, and can lead to a reactive hyperglycemia

## 2018-02-20 NOTE — ED NOTES
Spoke with NP who states to obtain urine sample and have provider evaluate her prior to obtaining blood. Pt given orange juice and sat outside of triage.

## 2018-04-12 ENCOUNTER — HOSPITAL ENCOUNTER (OUTPATIENT)
Dept: CT IMAGING | Age: 33
Discharge: HOME | End: 2018-04-12
Payer: COMMERCIAL

## 2018-04-12 DIAGNOSIS — R10.84 GENERALIZED ABDOMINAL PAIN: ICD-10-CM

## 2018-04-12 DIAGNOSIS — R11.0 NAUSEA: ICD-10-CM

## 2018-04-12 DIAGNOSIS — R14.0 BLOATING: ICD-10-CM

## 2018-04-12 PROCEDURE — 74011636320 HC RX REV CODE- 636/320: Performed by: NURSE PRACTITIONER

## 2018-04-12 PROCEDURE — 74011000258 HC RX REV CODE- 258: Performed by: NURSE PRACTITIONER

## 2018-04-12 PROCEDURE — 74177 CT ABD & PELVIS W/CONTRAST: CPT

## 2018-04-12 RX ORDER — SODIUM CHLORIDE 0.9 % (FLUSH) 0.9 %
10 SYRINGE (ML) INJECTION
Status: COMPLETED | OUTPATIENT
Start: 2018-04-12 | End: 2018-04-12

## 2018-04-12 RX ADMIN — Medication 10 ML: at 16:25

## 2018-04-12 RX ADMIN — DIATRIZOATE MEGLUMINE AND DIATRIZOATE SODIUM 15 ML: 660; 100 LIQUID ORAL; RECTAL at 15:25

## 2018-04-12 RX ADMIN — IOPAMIDOL 100 ML: 755 INJECTION, SOLUTION INTRAVENOUS at 16:25

## 2018-04-12 RX ADMIN — SODIUM CHLORIDE 100 ML: 900 INJECTION, SOLUTION INTRAVENOUS at 16:25

## 2018-05-07 ENCOUNTER — APPOINTMENT (OUTPATIENT)
Dept: CT IMAGING | Age: 33
End: 2018-05-07
Attending: EMERGENCY MEDICINE
Payer: COMMERCIAL

## 2018-05-07 ENCOUNTER — HOSPITAL ENCOUNTER (EMERGENCY)
Age: 33
Discharge: HOME | End: 2018-05-07
Attending: EMERGENCY MEDICINE
Payer: COMMERCIAL

## 2018-05-07 VITALS
DIASTOLIC BLOOD PRESSURE: 70 MMHG | OXYGEN SATURATION: 100 % | TEMPERATURE: 98.2 F | HEIGHT: 68 IN | WEIGHT: 191 LBS | HEART RATE: 62 BPM | SYSTOLIC BLOOD PRESSURE: 119 MMHG | RESPIRATION RATE: 18 BRPM | BODY MASS INDEX: 28.95 KG/M2

## 2018-05-07 DIAGNOSIS — R10.32 ABDOMINAL PAIN, LLQ (LEFT LOWER QUADRANT): Primary | ICD-10-CM

## 2018-05-07 LAB
ALBUMIN SERPL-MCNC: 3.9 G/DL (ref 3.5–5)
ALBUMIN/GLOB SERPL: 1 {RATIO} (ref 1.2–3.5)
ALP SERPL-CCNC: 67 U/L (ref 50–136)
ALT SERPL-CCNC: 24 U/L (ref 12–65)
ANION GAP SERPL CALC-SCNC: 7 MMOL/L (ref 7–16)
AST SERPL-CCNC: 21 U/L (ref 15–37)
BASOPHILS # BLD: 0 K/UL (ref 0–0.2)
BASOPHILS NFR BLD: 0 % (ref 0–2)
BILIRUB SERPL-MCNC: 0.8 MG/DL (ref 0.2–1.1)
BUN SERPL-MCNC: 16 MG/DL (ref 6–23)
CALCIUM SERPL-MCNC: 9 MG/DL (ref 8.3–10.4)
CHLORIDE SERPL-SCNC: 106 MMOL/L (ref 98–107)
CO2 SERPL-SCNC: 27 MMOL/L (ref 21–32)
CREAT SERPL-MCNC: 0.8 MG/DL (ref 0.6–1)
DIFFERENTIAL METHOD BLD: ABNORMAL
EOSINOPHIL # BLD: 0.1 K/UL (ref 0–0.8)
EOSINOPHIL NFR BLD: 2 % (ref 0.5–7.8)
ERYTHROCYTE [DISTWIDTH] IN BLOOD BY AUTOMATED COUNT: 12.3 % (ref 11.9–14.6)
GLOBULIN SER CALC-MCNC: 4.1 G/DL (ref 2.3–3.5)
GLUCOSE SERPL-MCNC: 93 MG/DL (ref 65–100)
HCT VFR BLD AUTO: 38.9 % (ref 35.8–46.3)
HGB BLD-MCNC: 13.1 G/DL (ref 11.7–15.4)
IMM GRANULOCYTES # BLD: 0 K/UL (ref 0–0.5)
IMM GRANULOCYTES NFR BLD AUTO: 0 % (ref 0–5)
LACTATE BLD-SCNC: 0.4 MMOL/L (ref 0.5–1.9)
LIPASE SERPL-CCNC: 106 U/L (ref 73–393)
LYMPHOCYTES # BLD: 2.8 K/UL (ref 0.5–4.6)
LYMPHOCYTES NFR BLD: 43 % (ref 13–44)
MCH RBC QN AUTO: 30.8 PG (ref 26.1–32.9)
MCHC RBC AUTO-ENTMCNC: 33.7 G/DL (ref 31.4–35)
MCV RBC AUTO: 91.3 FL (ref 79.6–97.8)
MONOCYTES # BLD: 0.3 K/UL (ref 0.1–1.3)
MONOCYTES NFR BLD: 5 % (ref 4–12)
NEUTS SEG # BLD: 3.2 K/UL (ref 1.7–8.2)
NEUTS SEG NFR BLD: 50 % (ref 43–78)
PLATELET # BLD AUTO: 228 K/UL (ref 150–450)
PMV BLD AUTO: 8.6 FL (ref 10.8–14.1)
POTASSIUM SERPL-SCNC: 4 MMOL/L (ref 3.5–5.1)
PROT SERPL-MCNC: 8 G/DL (ref 6.3–8.2)
RBC # BLD AUTO: 4.26 M/UL (ref 4.05–5.25)
SODIUM SERPL-SCNC: 140 MMOL/L (ref 136–145)
WBC # BLD AUTO: 6.4 K/UL (ref 4.3–11.1)

## 2018-05-07 PROCEDURE — 99285 EMERGENCY DEPT VISIT HI MDM: CPT | Performed by: EMERGENCY MEDICINE

## 2018-05-07 PROCEDURE — 74011250636 HC RX REV CODE- 250/636: Performed by: EMERGENCY MEDICINE

## 2018-05-07 PROCEDURE — 96365 THER/PROPH/DIAG IV INF INIT: CPT | Performed by: EMERGENCY MEDICINE

## 2018-05-07 PROCEDURE — 74011250637 HC RX REV CODE- 250/637: Performed by: EMERGENCY MEDICINE

## 2018-05-07 PROCEDURE — 80053 COMPREHEN METABOLIC PANEL: CPT | Performed by: EMERGENCY MEDICINE

## 2018-05-07 PROCEDURE — 74011000258 HC RX REV CODE- 258: Performed by: EMERGENCY MEDICINE

## 2018-05-07 PROCEDURE — 96375 TX/PRO/DX INJ NEW DRUG ADDON: CPT | Performed by: EMERGENCY MEDICINE

## 2018-05-07 PROCEDURE — 81003 URINALYSIS AUTO W/O SCOPE: CPT | Performed by: EMERGENCY MEDICINE

## 2018-05-07 PROCEDURE — 85025 COMPLETE CBC W/AUTO DIFF WBC: CPT | Performed by: EMERGENCY MEDICINE

## 2018-05-07 PROCEDURE — 74011250636 HC RX REV CODE- 250/636

## 2018-05-07 PROCEDURE — 74011636320 HC RX REV CODE- 636/320: Performed by: EMERGENCY MEDICINE

## 2018-05-07 PROCEDURE — 83605 ASSAY OF LACTIC ACID: CPT

## 2018-05-07 PROCEDURE — 74177 CT ABD & PELVIS W/CONTRAST: CPT

## 2018-05-07 PROCEDURE — 83690 ASSAY OF LIPASE: CPT | Performed by: EMERGENCY MEDICINE

## 2018-05-07 RX ORDER — SODIUM CHLORIDE 0.9 % (FLUSH) 0.9 %
10 SYRINGE (ML) INJECTION
Status: COMPLETED | OUTPATIENT
Start: 2018-05-07 | End: 2018-05-07

## 2018-05-07 RX ORDER — MORPHINE SULFATE 4 MG/ML
4 INJECTION INTRAVENOUS
Status: COMPLETED | OUTPATIENT
Start: 2018-05-07 | End: 2018-05-07

## 2018-05-07 RX ORDER — DICYCLOMINE HYDROCHLORIDE 20 MG/1
20 TABLET ORAL EVERY 6 HOURS
Qty: 20 TAB | Refills: 0 | Status: SHIPPED | OUTPATIENT
Start: 2018-05-07 | End: 2018-06-21 | Stop reason: SDUPTHER

## 2018-05-07 RX ORDER — CIPROFLOXACIN 500 MG/1
500 TABLET ORAL
Status: COMPLETED | OUTPATIENT
Start: 2018-05-07 | End: 2018-05-07

## 2018-05-07 RX ORDER — ONDANSETRON 2 MG/ML
INJECTION INTRAMUSCULAR; INTRAVENOUS
Status: COMPLETED
Start: 2018-05-07 | End: 2018-05-07

## 2018-05-07 RX ADMIN — MORPHINE SULFATE 4 MG: 4 INJECTION INTRAVENOUS at 14:45

## 2018-05-07 RX ADMIN — ONDANSETRON 4 MG: 2 INJECTION INTRAMUSCULAR; INTRAVENOUS at 15:56

## 2018-05-07 RX ADMIN — PIPERACILLIN SODIUM,TAZOBACTAM SODIUM 4.5 G: 4; .5 INJECTION, POWDER, FOR SOLUTION INTRAVENOUS at 15:34

## 2018-05-07 RX ADMIN — SODIUM CHLORIDE 100 ML: 900 INJECTION, SOLUTION INTRAVENOUS at 17:39

## 2018-05-07 RX ADMIN — Medication 10 ML: at 17:39

## 2018-05-07 RX ADMIN — IOPAMIDOL 100 ML: 755 INJECTION, SOLUTION INTRAVENOUS at 17:39

## 2018-05-07 RX ADMIN — CIPROFLOXACIN 500 MG: 500 TABLET, FILM COATED ORAL at 14:46

## 2018-05-07 NOTE — DISCHARGE INSTRUCTIONS
Abdominal Pain: Care Instructions  Your Care Instructions    Abdominal pain has many possible causes. Some aren't serious and get better on their own in a few days. Others need more testing and treatment. If your pain continues or gets worse, you need to be rechecked and may need more tests to find out what is wrong. You may need surgery to correct the problem. Don't ignore new symptoms, such as fever, nausea and vomiting, urination problems, pain that gets worse, and dizziness. These may be signs of a more serious problem. Your doctor may have recommended a follow-up visit in the next 8 to 12 hours. If you are not getting better, you may need more tests or treatment. The doctor has checked you carefully, but problems can develop later. If you notice any problems or new symptoms, get medical treatment right away. Follow-up care is a key part of your treatment and safety. Be sure to make and go to all appointments, and call your doctor if you are having problems. It's also a good idea to know your test results and keep a list of the medicines you take. How can you care for yourself at home? · Rest until you feel better. · To prevent dehydration, drink plenty of fluids, enough so that your urine is light yellow or clear like water. Choose water and other caffeine-free clear liquids until you feel better. If you have kidney, heart, or liver disease and have to limit fluids, talk with your doctor before you increase the amount of fluids you drink. · If your stomach is upset, eat mild foods, such as rice, dry toast or crackers, bananas, and applesauce. Try eating several small meals instead of two or three large ones. · Wait until 48 hours after all symptoms have gone away before you have spicy foods, alcohol, and drinks that contain caffeine. · Do not eat foods that are high in fat. · Avoid anti-inflammatory medicines such as aspirin, ibuprofen (Advil, Motrin), and naproxen (Aleve).  These can cause stomach upset. Talk to your doctor if you take daily aspirin for another health problem. When should you call for help? Call 911 anytime you think you may need emergency care. For example, call if:  ? · You passed out (lost consciousness). ? · You pass maroon or very bloody stools. ? · You vomit blood or what looks like coffee grounds. ? · You have new, severe belly pain. ?Call your doctor now or seek immediate medical care if:  ? · Your pain gets worse, especially if it becomes focused in one area of your belly. ? · You have a new or higher fever. ? · Your stools are black and look like tar, or they have streaks of blood. ? · You have unexpected vaginal bleeding. ? · You have symptoms of a urinary tract infection. These may include:  ¨ Pain when you urinate. ¨ Urinating more often than usual.  ¨ Blood in your urine. ? · You are dizzy or lightheaded, or you feel like you may faint. ? Watch closely for changes in your health, and be sure to contact your doctor if:  ? · You are not getting better after 1 day (24 hours). Where can you learn more? Go to http://chanda-laith.info/. Enter X790 in the search box to learn more about \"Abdominal Pain: Care Instructions. \"  Current as of: March 20, 2017  Content Version: 11.4  © 2654-8107 [employer]. Care instructions adapted under license by [email or web address] (which disclaims liability or warranty for this information). If you have questions about a medical condition or this instruction, always ask your healthcare professional. [place] [address] any warranty or liability for your use of this information.

## 2018-05-07 NOTE — ED PROVIDER NOTES
HPI Comments: SEEN in Triage: 71-year-old female with history of diverticulitis presents to the emergency department with increased abdominal pain. Patient was seen here about 2 weeks ago. Head CT done. Has seen GI. Recently completed a course of antibiotics    Increased pain. Nausea. No fever. Appears uncomfortable. Check labs, treat pain    Patient had a CT done about 3 weeks ago that showed inflammatory changes of the transverse colon. Today she is tender in the left lower quadrant. Check labs. We'll base CT decision on laboratory evaluation and recheck exam.  Tisha Preciado MD; 5/7/2018 @2:00 PM===========================================    Patient is a 71-year-old female who was diagnosed with diverticulitis via CT last month. She states she completed a course of Augmentin for 2 weeks. She states this morning the pain became acutely worse. She developed some nausea and vomiting. No urinary symptoms. Denies fevers but reports chills. Patient denies any pelvic issues or vaginal discharge. Patient is a 29 y.o. female presenting with abdominal pain. The history is provided by the patient. No  was used. Abdominal Pain    The pain is associated with an unknown factor. Associated symptoms include nausea and vomiting. Pertinent negatives include no fever, no dysuria, no headaches and no back pain.         Past Medical History:   Diagnosis Date    Anxiety     Depression     Diverticulitis 2011 2016    Diverticulosis 2011    Fibrocystic breast     Mammograms, q 1 year    Syncope     1st occur 2-3 months ag       Past Surgical History:   Procedure Laterality Date    BREAST SURGERY PROCEDURE UNLISTED Right 2014    biopsy    HX COLONOSCOPY  2016    Had in [place], has seen Dr Mt Barker         Family History:   Problem Relation Age of Onset    No Known Problems Mother     Hypertension Father        Social History     Social History    Marital status: SINGLE     Spouse name: N/A    Number of children: 1    Years of education: 13     Occupational History    phlebotomist      Social History Main Topics    Smoking status: Former Smoker     Quit date: 5/2/2009    Smokeless tobacco: Never Used    Alcohol use No    Drug use: No    Sexual activity: No     Other Topics Concern     Service No    Blood Transfusions No    Caffeine Concern No    Occupational Exposure Yes     phlebotomist.     Hobby Hazards No    Sleep Concern Yes    Stress Concern Yes    Weight Concern Yes    Special Diet No    Back Care Yes    Exercise No    Seat Belt Yes    Self-Exams No     Social History Narrative    Multiple brothers and sister an [employer].     Son , 12 years, [employer]    Hep B series had 2nd, 3rd will be in aug         ALLERGIES: Review of patient's allergies indicates no known allergies. Review of Systems   Constitutional: Negative for fatigue and fever. HENT: Negative for sore throat. Respiratory: Negative for cough, chest tightness and shortness of breath. Cardiovascular: Negative for leg swelling. Gastrointestinal: Positive for abdominal pain, nausea and vomiting. Genitourinary: Negative for dysuria. Musculoskeletal: Negative for back pain. Neurological: Negative for syncope and headaches. Psychiatric/Behavioral: Negative for confusion. Vitals:    05/07/18 1355   BP: 137/88   Pulse: 77   Resp: 18   Temp: 98.2 °F (36.8 °C)   SpO2: 97%   Weight: 86.6 kg (191 lb)   Height: 5' 8\" (1.727 m)            Physical Exam   Constitutional: She is oriented to person, place, and time. She appears well-developed and well-nourished. No distress. HENT:   Head: Normocephalic and atraumatic. Eyes: Conjunctivae and EOM are normal. Pupils are equal, round, and reactive to light. Neck: Normal range of motion. Neck supple. Cardiovascular: Normal rate, regular rhythm and normal heart sounds.     Pulmonary/Chest: Effort normal and breath sounds normal. No respiratory distress. She has no wheezes. She has no rales. Abdominal: Soft. She exhibits no distension. There is tenderness. There is no rebound. Moderate tenderness in the left lower quadrant. Slight guarding. No rebound. Musculoskeletal: Normal range of motion. She exhibits no edema or tenderness. Neurological: She is alert and oriented to person, place, and time. Skin: Skin is warm and dry. No rash noted. She is not diaphoretic. Psychiatric: She has a normal mood and affect. Her behavior is normal.   Vitals reviewed. MDM  Number of Diagnoses or Management Options  Abdominal pain, LLQ (left lower quadrant): new and does not require workup  Diagnosis management comments: labwork and CT are unremarkable. Vital signs stable here in the ED. I see no need for further antibiotics at this time as she has already completed a full course of abx and there is no evidence of infection. We'll discharge her home and have her follow up with a primary care provider. We'll provide Bentyl for pain to see if it is helpful. Patient reports pain is significantly gone down since being in the ER. Discharged in stable condition. Return precautions discussed. Vonnie Johnson MD; 5/7/2018 @6:28 PM Voice dictation software was used during the making of this note. This software is not perfect and grammatical and other typographical errors may be present. This note has not been proofread for errors.  ===================================================================      Vonnie Johnson MD; 5/7/2018 @6:27 PM Voice dictation software was used during the making of this note. This software is not perfect and grammatical and other typographical errors may be present.   This note has not been proofread for errors.  ===================================================================         Amount and/or Complexity of Data Reviewed  Clinical lab tests: reviewed and ordered (Results for orders placed or performed during the hospital encounter of 05/07/18  -CBC WITH AUTOMATED DIFF       Result                                            Value                         Ref Range                       WBC                                               6.4                           4.3 - 11.1 K/uL                 RBC                                               4.26                          4.05 - 5.25 M/uL                HGB                                               13.1                          11.7 - 15.4 g/dL                HCT                                               38.9                          35.8 - 46.3 %                   MCV                                               91.3                          79.6 - 97.8 FL                  MCH                                               30.8                          26.1 - 32.9 PG                  MCHC                                              33.7                          31.4 - 35.0 g/dL                RDW                                               12.3                          11.9 - 14.6 %                   PLATELET                                          228                           150 - 450 K/uL                  MPV                                               8.6 (L)                       10.8 - 14.1 FL                  DF                                                AUTOMATED                                                     NEUTROPHILS                                       50                            43 - 78 %                       LYMPHOCYTES                                       43                            13 - 44 %                       MONOCYTES                                         5                             4.0 - 12.0 %                    EOSINOPHILS                                       2                             0.5 - 7.8 %                     BASOPHILS                                         0                             0.0 - 2.0 % IMMATURE GRANULOCYTES                             0                             0.0 - 5.0 %                     ABS. NEUTROPHILS                                  3.2                           1.7 - 8.2 K/UL                  ABS. LYMPHOCYTES                                  2.8                           0.5 - 4.6 K/UL                  ABS. MONOCYTES                                    0.3                           0.1 - 1.3 K/UL                  ABS. EOSINOPHILS                                  0.1                           0.0 - 0.8 K/UL                  ABS. BASOPHILS                                    0.0                           0.0 - 0.2 K/UL                  ABS. IMM.  GRANS.                                  0.0                           0.0 - 0.5 K/UL             -METABOLIC PANEL, COMPREHENSIVE       Result                                            Value                         Ref Range                       Sodium                                            140                           136 - 145 mmol/L                Potassium                                         4.0                           3.5 - 5.1 mmol/L                Chloride                                          106                           98 - 107 mmol/L                 CO2                                               27                            21 - 32 mmol/L                  Anion gap                                         7                             7 - 16 mmol/L                   Glucose                                           93                            65 - 100 mg/dL                  BUN                                               16                            6 - 23 MG/DL                    Creatinine                                        0.80                          0.6 - 1.0 MG/DL                 GFR est AA                                        >60                           >60 ml/min/1.73m2 GFR est non-AA                                    >60                           >60 ml/min/1.73m2               Calcium                                           9.0                           8.3 - 10.4 MG/DL                Bilirubin, total                                  0.8                           0.2 - 1.1 MG/DL                 ALT (SGPT)                                        24                            12 - 65 U/L                     AST (SGOT)                                        21                            15 - 37 U/L                     Alk. phosphatase                                  67                            50 - 136 U/L                    Protein, total                                    8.0                           6.3 - 8.2 g/dL                  Albumin                                           3.9                           3.5 - 5.0 g/dL                  Globulin                                          4.1 (H)                       2.3 - 3.5 g/dL                  A-G Ratio                                         1.0 (L)                       1.2 - 3.5                  -LIPASE       Result                                            Value                         Ref Range                       Lipase                                            106                           73 - 393 U/L               -POC LACTIC ACID       Result                                            Value                         Ref Range                       Lactic Acid (POC)                                 0.4 (LL)                      0.5 - 1.9 mmol/L           )  Tests in the radiology section of CPT®: ordered and reviewed (Ct Abd Pelv W Cont    Result Date: 5/7/2018  CT of the Abdomen and Pelvis INDICATION:  Increased abdominal pain, history of diverticulitis Multiple axial images were obtained through the abdomen and pelvis after intravenous injection of 100mL of Isovue 370.   Radiation dose reduction techniques were used for this study: All CT scans performed at this facility use one or all of the following: Automated exposure control, adjustment of the mA and/or kVp according to patient's size, iterative reconstruction. Compared with 04/12/2018. FINDINGS: Abdomen CT:  The lung bases are clear. There are no lesions in the liver or spleen. The adrenal glands and pancreas appear normal.  There is normal enhancement of the kidneys. There is no hydronephrosis. There is no adenopathy. Diverticulosis is again noted throughout the colon. Focal inflammatory changes noted in the proximal transverse colon on the prior exam are no longer seen. No focal inflammation is seen. There is no free air or fluid. Pelvis CT:  There is also sigmoid diverticulosis. There are no inflammatory changes or fluid collections in the pelvis. There is no significant adenopathy or mass. There are no bony lesions. IMPRESSION: Diverticuli are noted throughout the colon.   No focal inflammatory change.     )  Review and summarize past medical records: yes  Independent visualization of images, tracings, or specimens: yes    Risk of Complications, Morbidity, and/or Mortality  Presenting problems: moderate  Diagnostic procedures: moderate  Management options: moderate    Patient Progress  Patient progress: stable        ED Course       Procedures

## 2018-05-07 NOTE — ED NOTES
Pt resting in bed,resp easy,VSS, belongs in reach. Pt taken to restroom via wheelchair. Pt denies any needs at this time.

## 2018-05-07 NOTE — ED NOTES
I have reviewed discharge instructions with the patient. The patient verbalized understanding. Patient left ED via Discharge Method: ambulatory to Home with self. Opportunity for questions and clarification provided. Patient given 1 scripts. To continue your aftercare when you leave the hospital, you may receive an automated call from our care team to check in on how you are doing. This is a free service and part of our promise to provide the best care and service to meet your aftercare needs.  If you have questions, or wish to unsubscribe from this service please call [phone]. Thank you for Choosing our [facility] Emergency Department.

## 2018-06-06 PROBLEM — G47.00 INSOMNIA: Status: ACTIVE | Noted: 2018-06-06

## 2018-06-06 PROBLEM — E55.9 VITAMIN D DEFICIENCY: Status: ACTIVE | Noted: 2018-06-06

## 2018-06-06 PROBLEM — R55 NEAR SYNCOPE: Status: ACTIVE | Noted: 2018-06-06

## 2018-06-06 PROBLEM — I34.0 MITRAL VALVE REGURGITATION: Status: ACTIVE | Noted: 2018-05-21

## 2018-06-06 PROBLEM — I34.1 MVP (MITRAL VALVE PROLAPSE): Status: ACTIVE | Noted: 2018-05-21

## 2018-06-06 PROBLEM — Z87.19 HX OF DIVERTICULITIS OF COLON: Status: ACTIVE | Noted: 2018-06-06

## 2018-06-06 PROBLEM — R00.2 HEART PALPITATIONS: Status: ACTIVE | Noted: 2018-06-06

## 2018-06-06 PROBLEM — M51.9 LUMBAR DISC DISORDER: Status: ACTIVE | Noted: 2018-06-06

## 2018-06-06 PROBLEM — R01.1 MURMUR, CARDIAC: Status: ACTIVE | Noted: 2018-06-06

## 2018-06-06 PROBLEM — D24.1 FIBROADENOMA OF BREAST, RIGHT: Status: ACTIVE | Noted: 2018-06-06

## 2018-06-06 PROBLEM — F41.0 SEVERE ANXIETY WITH PANIC: Status: ACTIVE | Noted: 2018-06-06

## 2018-06-06 PROBLEM — F32.A DEPRESSION: Status: ACTIVE | Noted: 2018-06-06

## 2018-06-21 ENCOUNTER — HOSPITAL ENCOUNTER (OUTPATIENT)
Dept: LAB | Age: 33
Discharge: HOME | End: 2018-06-21
Payer: COMMERCIAL

## 2018-06-21 DIAGNOSIS — R10.30 LOWER ABDOMINAL PAIN: ICD-10-CM

## 2018-06-21 LAB
ANION GAP SERPL CALC-SCNC: 4 MMOL/L
BASOPHILS # BLD: 0 K/UL (ref 0–0.2)
BASOPHILS NFR BLD: 0 % (ref 0–2)
BUN SERPL-MCNC: 11 MG/DL (ref 6–23)
CALCIUM SERPL-MCNC: 8.7 MG/DL (ref 8.3–10.4)
CHLORIDE SERPL-SCNC: 104 MMOL/L (ref 98–107)
CO2 SERPL-SCNC: 30 MMOL/L (ref 21–32)
CREAT SERPL-MCNC: 1 MG/DL (ref 0.6–1)
DIFFERENTIAL METHOD BLD: ABNORMAL
EOSINOPHIL # BLD: 0.1 K/UL (ref 0–0.8)
EOSINOPHIL NFR BLD: 1 % (ref 0.5–7.8)
ERYTHROCYTE [DISTWIDTH] IN BLOOD BY AUTOMATED COUNT: 11.7 % (ref 11.9–14.6)
GLUCOSE SERPL-MCNC: 92 MG/DL (ref 65–100)
HCT VFR BLD AUTO: 38.6 % (ref 35.8–46.3)
HGB BLD-MCNC: 13.2 G/DL (ref 11.7–15.4)
LYMPHOCYTES # BLD: 2.7 K/UL (ref 0.5–4.6)
LYMPHOCYTES NFR BLD: 43 % (ref 13–44)
MCH RBC QN AUTO: 32 PG (ref 26.1–32.9)
MCHC RBC AUTO-ENTMCNC: 34.2 G/DL (ref 31.4–35)
MCV RBC AUTO: 93.7 FL (ref 79.6–97.8)
MONOCYTES # BLD: 0.4 K/UL (ref 0.1–1.3)
MONOCYTES NFR BLD: 7 % (ref 4–12)
NEUTS SEG # BLD: 3.2 K/UL (ref 1.7–8.2)
NEUTS SEG NFR BLD: 49 % (ref 43–78)
PLATELET # BLD AUTO: 248 K/UL (ref 150–450)
PMV BLD AUTO: 8 FL (ref 10.8–14.1)
POTASSIUM SERPL-SCNC: 3.8 MMOL/L (ref 3.5–5.1)
RBC # BLD AUTO: 4.12 M/UL (ref 4.05–5.25)
SODIUM SERPL-SCNC: 138 MMOL/L (ref 136–145)
WBC # BLD AUTO: 6.3 K/UL (ref 4.3–11.1)

## 2018-06-21 PROCEDURE — 85025 COMPLETE CBC W/AUTO DIFF WBC: CPT | Performed by: NURSE PRACTITIONER

## 2018-06-21 PROCEDURE — 80048 BASIC METABOLIC PNL TOTAL CA: CPT | Performed by: NURSE PRACTITIONER

## 2018-06-21 PROCEDURE — 36415 COLL VENOUS BLD VENIPUNCTURE: CPT | Performed by: NURSE PRACTITIONER

## 2018-07-16 ENCOUNTER — APPOINTMENT (OUTPATIENT)
Dept: GENERAL RADIOLOGY | Age: 33
End: 2018-07-16
Attending: EMERGENCY MEDICINE
Payer: COMMERCIAL

## 2018-07-16 ENCOUNTER — HOSPITAL ENCOUNTER (EMERGENCY)
Age: 33
Discharge: HOME | End: 2018-07-16
Attending: EMERGENCY MEDICINE
Payer: COMMERCIAL

## 2018-07-16 VITALS
HEART RATE: 72 BPM | HEIGHT: 68 IN | RESPIRATION RATE: 16 BRPM | TEMPERATURE: 98.4 F | BODY MASS INDEX: 29.1 KG/M2 | SYSTOLIC BLOOD PRESSURE: 134 MMHG | WEIGHT: 192 LBS | OXYGEN SATURATION: 100 % | DIASTOLIC BLOOD PRESSURE: 82 MMHG

## 2018-07-16 DIAGNOSIS — S60.111A: Primary | ICD-10-CM

## 2018-07-16 PROCEDURE — 74011250637 HC RX REV CODE- 250/637: Performed by: PHYSICIAN ASSISTANT

## 2018-07-16 PROCEDURE — 73130 X-RAY EXAM OF HAND: CPT

## 2018-07-16 PROCEDURE — 75810000053 HC SPLINT APPLICATION: Performed by: PHYSICIAN ASSISTANT

## 2018-07-16 PROCEDURE — 77030008303 HC SPLNT FNGR ALUM CONC -A: Performed by: PHYSICIAN ASSISTANT

## 2018-07-16 PROCEDURE — 99283 EMERGENCY DEPT VISIT LOW MDM: CPT | Performed by: PHYSICIAN ASSISTANT

## 2018-07-16 RX ORDER — KETOROLAC TROMETHAMINE 10 MG/1
10 TABLET, FILM COATED ORAL
Status: COMPLETED | OUTPATIENT
Start: 2018-07-16 | End: 2018-07-16

## 2018-07-16 RX ORDER — IBUPROFEN 600 MG/1
600 TABLET ORAL
Qty: 20 TAB | Refills: 0 | Status: SHIPPED | OUTPATIENT
Start: 2018-07-16 | End: 2018-10-17

## 2018-07-16 RX ADMIN — KETOROLAC TROMETHAMINE 10 MG: 10 TABLET, FILM COATED ORAL at 17:23

## 2018-07-16 NOTE — DISCHARGE INSTRUCTIONS
Finger Bruises: Care Instructions  Your Care Instructions    Bruises occur when small blood vessels under your skin tear or rupture, most often from a twist, bump, or fall. Blood leaks into tissues under the skin and causes a black-and-blue color that may become purplish black, reddish blue, or yellowish green as the bruise heals. Rest and home treatment can help you heal.  Your doctor may have taped the bruised finger to the one next to it or put a splint on the finger to keep it in position while it heals. The doctor may recommend exercises to strengthen your finger. If you damaged bones or muscles, you may need more treatment. Most bruises aren't serious and will go away on their own within 2 to 4 weeks. Follow-up care is a key part of your treatment and safety. Be sure to make and go to all appointments, and call your doctor if you are having problems. It's also a good idea to know your test results and keep a list of the medicines you take. How can you care for yourself at home? · Put ice or a cold pack on the finger for 10 to 20 minutes at a time. Put a thin cloth between the ice and your skin. · Prop up your hand on a pillow when you ice your finger or anytime you sit or lie down during the next 3 days. Try to keep your hand above the level of your heart. This will help reduce swelling. · If your doctor put a splint on your finger, wear the splint exactly as directed. Make sure the splint is not so tight that your finger gets numb or tingles. You can loosen the splint if it's too tight. · If you have your fingers taped together, make sure the tape is snug but not so tight that your fingers get numb or tingle. You can loosen the tape if it's too tight. If you need to retape your fingers, always put padding between the fingers before putting on the new tape. Limit use of your finger to motions or activities that don't cause pain. · Take pain medicines exactly as directed.   ¨ If the doctor gave you a prescription medicine for pain, take it as prescribed. ¨ If you are not taking a prescription pain medicine, ask your doctor if you can take an over-the-counter medicine. · Be sure to follow your doctor's advice about moving and exercising your injured finger. When should you call for help? Call your doctor now or seek immediate medical care if:    · You have symptoms of infection, such as:  ¨ Increased pain, swelling, warmth, or redness. ¨ Red streaks leading from the area. ¨ Pus draining from the area. ¨ A fever.     · Your finger is cool or pale or changes color.    Watch closely for changes in your health, and be sure to contact your doctor if:    · You have new or worse pain.     · Your finger does not get better as expected. Where can you learn more? Go to http://chanda-laith.info/. Enter D134 in the search box to learn more about \"Finger Bruises: Care Instructions. \"  Current as of: November 20, 2017  Content Version: 11.7  © 4786-2235 [employer]. Care instructions adapted under license by [employer] (which disclaims liability or warranty for this information). If you have questions about a medical condition or this instruction, always ask your healthcare professional. [address] any warranty or liability for your use of this information.

## 2018-07-16 NOTE — ED TRIAGE NOTES
Per patient shut r hand in car door 1 day prior to arrival. Bruising noted to #1 digit.  Patient states pain did not ease with ice and rest.

## 2018-07-16 NOTE — LETTER
[facility] EMERGENCY DEPT [address] [address] 
[phone] Work/School Note Date: 7/16/2018 To Whom It May concern: 
 
Spenser Ni was seen and treated today in the emergency room by the following provider(s): 
Attending Provider: Murphy Fuller MD 
Physician Assistant: AJAY Motley. Spenser Ni may return to work on 07/19/18. Sincerely, AJAY Motley

## 2018-07-16 NOTE — ED PROVIDER NOTES
HPI Comments: Patient accidentally slammed her right thumb into the car door yesterday. She is right-handed. She does have some bruising under the nail however she has fake nails on. She works as a phlebotomist.  She has no other injuries to the hand and was ambulatory to the room without difficulty and well hydrated. Patient is a 29 y.o. female presenting with hand pain. The history is provided by the patient. Hand Pain This is a new problem. The current episode started yesterday. The problem occurs constantly. The problem has not changed since onset. The pain is present in the right fingers. The quality of the pain is described as aching. The pain is at a severity of 6/10. The pain is moderate. Pertinent negatives include no numbness, full range of motion, no stiffness, no tingling, no itching, no back pain and no neck pain. The symptoms are aggravated by movement. She has tried nothing for the symptoms. There has been a history of trauma. Past Medical History:  
Diagnosis Date  Anxiety  Depression  Diverticulitis 2011 2016  Diverticulosis 2011  Fibroadenoma of breast, right  Insomnia  Lumbar disc disorder  Mitral valve regurgitation 05/21/2018 1+  MVP (mitral valve prolapse) 05/21/2018  
 mild anterior leaflet MVP  Syncope 1st occur 2-3 months ag  Vitamin D deficiency 05/29/2018 Past Surgical History:  
Procedure Laterality Date  BREAST SURGERY PROCEDURE UNLISTED Right 2014  
 biopsy  HX COLONOSCOPY  2016 Had in [place], has seen Dr Valerie Baca Family History:  
Problem Relation Age of Onset  No Known Problems Mother  Hypertension Father Social History Social History  Marital status: SINGLE Spouse name: N/A  
 Number of children: 1  Years of education: 13 Occupational History  phlebotomist   
 
Social History Main Topics  Smoking status: Former Smoker Quit date: 5/2/2009  Smokeless tobacco: Never Used  Alcohol use No  
 Drug use: No  
 Sexual activity: No  
 
Other Topics Concern   Service No  
 Blood Transfusions No  
 Caffeine Concern No  
 Occupational Exposure Yes  
  phlebotomist.   
Toyin Lewis Hazards No  
 Sleep Concern Yes  Stress Concern Yes  Weight Concern Yes  Special Diet No  
 Back Care Yes  Exercise No  
 Seat Belt Yes  Self-Exams No  
 
Social History Narrative Multiple brothers and sister an A & W.   
 Son , 12 years, [employer] Hep B series had 2nd, 3rd will be in aug ALLERGIES: Review of patient's allergies indicates no known allergies. Review of Systems Constitutional: Negative. HENT: Negative. Eyes: Negative. Respiratory: Negative. Cardiovascular: Negative. Gastrointestinal: Negative. Genitourinary: Negative. Musculoskeletal: Negative. Negative for back pain, neck pain and stiffness. Right thumb pain Skin: Negative. Negative for itching. Neurological: Negative. Negative for tingling and numbness. Psychiatric/Behavioral: Negative. All other systems reviewed and are negative. Vitals:  
 07/16/18 1616 07/16/18 1618 BP: 135/79 Pulse: 69 Resp: 16 Temp:  98.4 °F (36.9 °C) SpO2: 100% Weight: 87.1 kg (192 lb) Height: 5' 8\" (1.727 m) Physical Exam  
Constitutional: She is oriented to person, place, and time. She appears well-developed and well-nourished. HENT:  
Head: Normocephalic and atraumatic. Right Ear: External ear normal.  
Left Ear: External ear normal.  
Nose: Nose normal.  
Mouth/Throat: Oropharynx is clear and moist.  
Eyes: Conjunctivae and EOM are normal. Pupils are equal, round, and reactive to light. Neck: Normal range of motion. Neck supple. Cardiovascular: Normal rate, regular rhythm, normal heart sounds and intact distal pulses. Pulmonary/Chest: Effort normal and breath sounds normal.  
Abdominal: Soft.  Bowel sounds are normal. Musculoskeletal: Normal range of motion. Hands: 
Neurological: She is alert and oriented to person, place, and time. She has normal reflexes. Skin: Skin is warm and dry. Psychiatric: She has a normal mood and affect. Her behavior is normal. Judgment and thought content normal.  
Nursing note and vitals reviewed. MDM Number of Diagnoses or Management Options Contusion of right index finger without damage to nail, initial encounter: minor Amount and/or Complexity of Data Reviewed Tests in the radiology section of CPT®: ordered and reviewed Risk of Complications, Morbidity, and/or Mortality Presenting problems: moderate Diagnostic procedures: moderate Management options: moderate Patient Progress Patient progress: stable ED Course Procedures The patient was observed in the ED. Results Reviewed: XR HAND RT MIN 3 V Final Result Impression:  No acute bony findings Rest, ice, elevate, avoid painful activities. ED if worse. Follow up with Ortho for recheck. Aluminum finger splint applied by the nurse. Note for work given. I discussed the results of all labs, procedures, radiographs, and treatments with the patient and available family. Treatment plan is agreed upon and the patient is ready for discharge. All voiced understanding of the discharge plan and medication instructions or changes as appropriate. Questions about treatment in the ED were answered. All were encouraged to return should symptoms worsen or new problems develop.

## 2018-07-16 NOTE — ED NOTES
I have reviewed discharge instructions with the patient. The patient verbalized understanding. Patient left ED via Discharge Method: ambulatory to Home with SELF. Opportunity for questions and clarification provided. Patient given 1 scripts. To continue your aftercare when you leave the hospital, you may receive an automated call from our care team to check in on how you are doing. This is a free service and part of our promise to provide the best care and service to meet your aftercare needs.  If you have questions, or wish to unsubscribe from this service please call [phone]. Thank you for Choosing our [facility] Emergency Department.

## 2018-10-17 PROBLEM — B37.31 YEAST VAGINITIS: Status: ACTIVE | Noted: 2018-10-17

## 2018-11-27 ENCOUNTER — HOSPITAL ENCOUNTER (OUTPATIENT)
Dept: LAB | Age: 33
Discharge: HOME | End: 2018-11-27
Payer: COMMERCIAL

## 2018-11-27 DIAGNOSIS — R82.998 DARK URINE: ICD-10-CM

## 2018-11-27 DIAGNOSIS — R10.9 ABDOMINAL PAIN, UNSPECIFIED ABDOMINAL LOCATION: ICD-10-CM

## 2018-11-27 LAB
ALBUMIN SERPL-MCNC: 3.5 G/DL (ref 3.5–5)
ALBUMIN/GLOB SERPL: 0.9 {RATIO}
ALP SERPL-CCNC: 69 U/L (ref 50–136)
ALT SERPL-CCNC: 17 U/L (ref 12–65)
ANION GAP SERPL CALC-SCNC: 6 MMOL/L
ANION GAP SERPL CALC-SCNC: 6 MMOL/L
AST SERPL-CCNC: 15 U/L (ref 15–37)
BASOPHILS # BLD: 0 K/UL (ref 0–0.2)
BASOPHILS NFR BLD: 1 % (ref 0–2)
BILIRUB SERPL-MCNC: 0.4 MG/DL (ref 0.2–1.1)
BUN SERPL-MCNC: 14 MG/DL (ref 6–23)
BUN SERPL-MCNC: 14 MG/DL (ref 6–23)
CALCIUM SERPL-MCNC: 8.2 MG/DL (ref 8.3–10.4)
CALCIUM SERPL-MCNC: 8.2 MG/DL (ref 8.3–10.4)
CHLORIDE SERPL-SCNC: 106 MMOL/L (ref 98–107)
CHLORIDE SERPL-SCNC: 106 MMOL/L (ref 98–107)
CO2 SERPL-SCNC: 26 MMOL/L (ref 21–32)
CO2 SERPL-SCNC: 26 MMOL/L (ref 21–32)
CREAT SERPL-MCNC: 0.8 MG/DL (ref 0.6–1)
CREAT SERPL-MCNC: 0.8 MG/DL (ref 0.6–1)
DIFFERENTIAL METHOD BLD: ABNORMAL
EOSINOPHIL # BLD: 0.1 K/UL (ref 0–0.8)
EOSINOPHIL NFR BLD: 2 % (ref 0.5–7.8)
ERYTHROCYTE [DISTWIDTH] IN BLOOD BY AUTOMATED COUNT: 11.6 %
GLOBULIN SER CALC-MCNC: 3.8 G/DL
GLUCOSE SERPL-MCNC: 89 MG/DL (ref 65–100)
GLUCOSE SERPL-MCNC: 89 MG/DL (ref 65–100)
HCT VFR BLD AUTO: 37.2 % (ref 35.8–46.3)
HGB BLD-MCNC: 12.6 G/DL (ref 11.7–15.4)
IMM GRANULOCYTES # BLD: 0 K/UL (ref 0–0.5)
IMM GRANULOCYTES NFR BLD AUTO: 0 % (ref 0–5)
LYMPHOCYTES # BLD: 2.6 K/UL (ref 0.5–4.6)
LYMPHOCYTES NFR BLD: 43 % (ref 13–44)
MCH RBC QN AUTO: 31.4 PG (ref 26.1–32.9)
MCHC RBC AUTO-ENTMCNC: 33.9 G/DL (ref 31.4–35)
MCV RBC AUTO: 92.8 FL (ref 79.6–97.8)
MONOCYTES # BLD: 0.5 K/UL (ref 0.1–1.3)
MONOCYTES NFR BLD: 8 % (ref 4–12)
NEUTS SEG # BLD: 2.8 K/UL (ref 1.7–8.2)
NEUTS SEG NFR BLD: 46 % (ref 43–78)
NRBC # BLD: 0 K/UL (ref 0–0.2)
PLATELET # BLD AUTO: 220 K/UL (ref 150–450)
PMV BLD AUTO: 8.6 FL (ref 9.4–12.3)
POTASSIUM SERPL-SCNC: 3.7 MMOL/L (ref 3.5–5.1)
POTASSIUM SERPL-SCNC: 3.7 MMOL/L (ref 3.5–5.1)
PROT SERPL-MCNC: 7.3 G/DL (ref 6.3–8.2)
RBC # BLD AUTO: 4.01 M/UL (ref 4.05–5.2)
SODIUM SERPL-SCNC: 138 MMOL/L (ref 136–145)
SODIUM SERPL-SCNC: 138 MMOL/L (ref 136–145)
WBC # BLD AUTO: 6.1 K/UL (ref 4.3–11.1)

## 2018-11-27 PROCEDURE — 85025 COMPLETE CBC W/AUTO DIFF WBC: CPT

## 2018-11-27 PROCEDURE — 80053 COMPREHEN METABOLIC PANEL: CPT

## 2018-11-27 PROCEDURE — 36415 COLL VENOUS BLD VENIPUNCTURE: CPT

## 2019-04-01 ENCOUNTER — HOSPITAL ENCOUNTER (OUTPATIENT)
Dept: MAMMOGRAPHY | Age: 34
Discharge: HOME | End: 2019-04-01
Attending: SURGERY
Payer: COMMERCIAL

## 2019-04-01 DIAGNOSIS — D24.1 FIBROADENOMA OF RIGHT BREAST: ICD-10-CM

## 2019-04-01 DIAGNOSIS — D24.1 FIBROADENOMA OF BREAST, RIGHT: ICD-10-CM

## 2019-04-01 PROCEDURE — 77066 DX MAMMO INCL CAD BI: CPT

## 2019-04-01 PROCEDURE — 76642 ULTRASOUND BREAST LIMITED: CPT

## 2019-06-18 ENCOUNTER — HOSPITAL ENCOUNTER (OUTPATIENT)
Dept: CT IMAGING | Age: 34
Discharge: HOME | End: 2019-06-18
Attending: INTERNAL MEDICINE
Payer: COMMERCIAL

## 2019-06-18 DIAGNOSIS — G89.29 CHRONIC NONINTRACTABLE HEADACHE, UNSPECIFIED HEADACHE TYPE: Chronic | ICD-10-CM

## 2019-06-18 DIAGNOSIS — R51.9 CHRONIC NONINTRACTABLE HEADACHE, UNSPECIFIED HEADACHE TYPE: Chronic | ICD-10-CM

## 2019-06-18 PROCEDURE — 70450 CT HEAD/BRAIN W/O DYE: CPT

## 2019-07-15 PROBLEM — F33.2 DEPRESSION, MAJOR, SEVERE RECURRENCE (HCC): Status: ACTIVE | Noted: 2019-07-15

## 2019-08-16 ENCOUNTER — HOSPITAL ENCOUNTER (OUTPATIENT)
Dept: LAB | Age: 34
Discharge: HOME | End: 2019-08-16
Attending: INTERNAL MEDICINE
Payer: COMMERCIAL

## 2019-08-16 DIAGNOSIS — G89.29 CHRONIC NONINTRACTABLE HEADACHE, UNSPECIFIED HEADACHE TYPE: Chronic | ICD-10-CM

## 2019-08-16 DIAGNOSIS — K58.2 IRRITABLE BOWEL SYNDROME WITH BOTH CONSTIPATION AND DIARRHEA: Chronic | ICD-10-CM

## 2019-08-16 DIAGNOSIS — R63.5 WEIGHT GAIN: ICD-10-CM

## 2019-08-16 DIAGNOSIS — R51.9 CHRONIC NONINTRACTABLE HEADACHE, UNSPECIFIED HEADACHE TYPE: Chronic | ICD-10-CM

## 2019-08-16 DIAGNOSIS — F32.A MILD DEPRESSION: Chronic | ICD-10-CM

## 2019-08-16 DIAGNOSIS — E66.9 OBESITY (BMI 30.0-34.9): ICD-10-CM

## 2019-08-16 DIAGNOSIS — F41.0 SEVERE ANXIETY WITH PANIC: Chronic | ICD-10-CM

## 2019-08-16 LAB
ALBUMIN SERPL-MCNC: 4 G/DL (ref 3.5–5)
ALBUMIN/GLOB SERPL: 1 {RATIO} (ref 1.2–3.5)
ALP SERPL-CCNC: 80 U/L (ref 50–136)
ALT SERPL-CCNC: 23 U/L (ref 12–65)
ANION GAP SERPL CALC-SCNC: 7 MMOL/L (ref 7–16)
AST SERPL-CCNC: 19 U/L (ref 15–37)
BASOPHILS # BLD: 0 K/UL (ref 0–0.2)
BASOPHILS NFR BLD: 0 % (ref 0–2)
BILIRUB SERPL-MCNC: 0.6 MG/DL (ref 0.2–1.1)
BUN SERPL-MCNC: 11 MG/DL (ref 6–23)
CALCIUM SERPL-MCNC: 9.5 MG/DL (ref 8.3–10.4)
CHLORIDE SERPL-SCNC: 105 MMOL/L (ref 98–107)
CO2 SERPL-SCNC: 28 MMOL/L (ref 21–32)
CREAT SERPL-MCNC: 0.8 MG/DL (ref 0.6–1)
DIFFERENTIAL METHOD BLD: ABNORMAL
EOSINOPHIL # BLD: 0.1 K/UL (ref 0–0.8)
EOSINOPHIL NFR BLD: 1 % (ref 0.5–7.8)
ERYTHROCYTE [DISTWIDTH] IN BLOOD BY AUTOMATED COUNT: 12 % (ref 11.9–14.6)
GLOBULIN SER CALC-MCNC: 3.9 G/DL (ref 2.3–3.5)
GLUCOSE SERPL-MCNC: 99 MG/DL (ref 65–100)
HCT VFR BLD AUTO: 38 % (ref 35.8–46.3)
HGB BLD-MCNC: 12.8 G/DL (ref 11.7–15.4)
IMM GRANULOCYTES # BLD AUTO: 0 K/UL (ref 0–0.5)
IMM GRANULOCYTES NFR BLD AUTO: 0 % (ref 0–5)
LYMPHOCYTES # BLD: 2.8 K/UL (ref 0.5–4.6)
LYMPHOCYTES NFR BLD: 42 % (ref 13–44)
MCH RBC QN AUTO: 31.4 PG (ref 26.1–32.9)
MCHC RBC AUTO-ENTMCNC: 33.7 G/DL (ref 31.4–35)
MCV RBC AUTO: 93.4 FL (ref 79.6–97.8)
MONOCYTES # BLD: 0.4 K/UL (ref 0.1–1.3)
MONOCYTES NFR BLD: 6 % (ref 4–12)
NEUTS SEG # BLD: 3.3 K/UL (ref 1.7–8.2)
NEUTS SEG NFR BLD: 51 % (ref 43–78)
NRBC # BLD: 0 K/UL (ref 0–0.2)
PLATELET # BLD AUTO: 249 K/UL (ref 150–450)
PMV BLD AUTO: 8.3 FL (ref 9.4–12.3)
POTASSIUM SERPL-SCNC: 3.8 MMOL/L (ref 3.5–5.1)
PROT SERPL-MCNC: 7.9 G/DL (ref 6.3–8.2)
RBC # BLD AUTO: 4.07 M/UL (ref 4.05–5.2)
SODIUM SERPL-SCNC: 140 MMOL/L (ref 136–145)
TSH SERPL DL<=0.005 MIU/L-ACNC: 1.61 UIU/ML (ref 0.36–3.74)
WBC # BLD AUTO: 6.7 K/UL (ref 4.3–11.1)

## 2019-08-16 PROCEDURE — 85025 COMPLETE CBC W/AUTO DIFF WBC: CPT

## 2019-08-16 PROCEDURE — 80061 LIPID PANEL: CPT

## 2019-08-16 PROCEDURE — 84443 ASSAY THYROID STIM HORMONE: CPT

## 2019-08-16 PROCEDURE — 80053 COMPREHEN METABOLIC PANEL: CPT

## 2019-08-16 PROCEDURE — 36415 COLL VENOUS BLD VENIPUNCTURE: CPT

## 2019-08-19 LAB
CHOLEST SERPL-MCNC: 180 MG/DL
HDLC SERPL-MCNC: 88 MG/DL (ref 40–60)
HDLC SERPL: 2 {RATIO}
LDLC SERPL CALC-MCNC: 78.8 MG/DL
TRIGL SERPL-MCNC: 66 MG/DL (ref 35–150)
VLDLC SERPL CALC-MCNC: 13.2 MG/DL (ref 6–23)

## 2019-08-22 PROBLEM — Z91.199 GENERAL PATIENT NONCOMPLIANCE: Status: ACTIVE | Noted: 2019-08-22

## 2020-02-27 ENCOUNTER — HOSPITAL ENCOUNTER (EMERGENCY)
Age: 35
Discharge: HOME | End: 2020-02-27
Attending: EMERGENCY MEDICINE
Payer: SELF-PAY

## 2020-02-27 VITALS
DIASTOLIC BLOOD PRESSURE: 61 MMHG | SYSTOLIC BLOOD PRESSURE: 119 MMHG | HEART RATE: 87 BPM | RESPIRATION RATE: 18 BRPM | WEIGHT: 208 LBS | TEMPERATURE: 98.4 F | BODY MASS INDEX: 31.52 KG/M2 | HEIGHT: 68 IN | OXYGEN SATURATION: 100 %

## 2020-02-27 DIAGNOSIS — F41.1 ANXIETY STATE: Primary | ICD-10-CM

## 2020-02-27 PROCEDURE — 74011250636 HC RX REV CODE- 250/636: Performed by: EMERGENCY MEDICINE

## 2020-02-27 PROCEDURE — 96372 THER/PROPH/DIAG INJ SC/IM: CPT

## 2020-02-27 PROCEDURE — 99283 EMERGENCY DEPT VISIT LOW MDM: CPT

## 2020-02-27 PROCEDURE — 74011250637 HC RX REV CODE- 250/637: Performed by: EMERGENCY MEDICINE

## 2020-02-27 RX ORDER — HALOPERIDOL 5 MG/ML
5 INJECTION INTRAMUSCULAR
Status: COMPLETED | OUTPATIENT
Start: 2020-02-27 | End: 2020-02-27

## 2020-02-27 RX ORDER — LORAZEPAM 2 MG/ML
1 INJECTION INTRAMUSCULAR
Status: COMPLETED | OUTPATIENT
Start: 2020-02-27 | End: 2020-02-27

## 2020-02-27 RX ORDER — LORAZEPAM 1 MG/1
1 TABLET ORAL
Status: COMPLETED | OUTPATIENT
Start: 2020-02-27 | End: 2020-02-27

## 2020-02-27 RX ORDER — HYDROXYZINE PAMOATE 50 MG/1
50 CAPSULE ORAL
Qty: 20 CAP | Refills: 0 | Status: SHIPPED | OUTPATIENT
Start: 2020-02-27 | End: 2020-03-12

## 2020-02-27 RX ADMIN — LORAZEPAM 1 MG: 2 INJECTION INTRAMUSCULAR; INTRAVENOUS at 03:04

## 2020-02-27 RX ADMIN — HALOPERIDOL LACTATE 5 MG: 5 INJECTION INTRAMUSCULAR at 03:04

## 2020-02-27 RX ADMIN — LORAZEPAM 1 MG: 1 TABLET ORAL at 02:39

## 2020-02-27 NOTE — ED PROVIDER NOTES
81-year-old female who appears distressed presenting for an anxiety attack. She reports that she has anxiety attacks but this is the worst it is been in quite some time. She states that nothing in particular happened but she has had ongoing issues with anxiety she does get inside her on it. She was at work tonight started to feel flushed and her heart was racing so she stepped outside thinking the cooler may help her relax. When she went outside she felt that she was spiraling out of control and her heart was racing more. She felt lightheaded so she brought her self insights that she did not pass out inside the cold. The history is provided by the patient. Anxiety    This is a recurrent problem. The current episode started 1 to 2 hours ago. The problem has been gradually worsening. The problem occurs rarely. The pain is associated with stress. No past medical history on file. No past surgical history on file. No family history on file.     Social History     Socioeconomic History    Marital status: Not on file     Spouse name: Not on file    Number of children: Not on file    Years of education: Not on file    Highest education level: Not on file   Occupational History    Not on file   Social Needs    Financial resource strain: Not on file    Food insecurity:     Worry: Not on file     Inability: Not on file    Transportation needs:     Medical: Not on file     Non-medical: Not on file   Tobacco Use    Smoking status: Not on file   Substance and Sexual Activity    Alcohol use: Not on file    Drug use: Not on file    Sexual activity: Not on file   Lifestyle    Physical activity:     Days per week: Not on file     Minutes per session: Not on file    Stress: Not on file   Relationships    Social connections:     Talks on phone: Not on file     Gets together: Not on file     Attends Christianity service: Not on file     Active member of club or organization: Not on file     Attends meetings of clubs or organizations: Not on file     Relationship status: Not on file    Intimate partner violence:     Fear of current or ex partner: Not on file     Emotionally abused: Not on file     Physically abused: Not on file     Forced sexual activity: Not on file   Other Topics Concern    Not on file   Social History Narrative    Not on file         ALLERGIES: Patient has no known allergies. Review of Systems   Psychiatric/Behavioral: Positive for agitation. The patient is nervous/anxious. All other systems reviewed and are negative. Vitals:    02/27/20 0226   BP: 142/85   Pulse: (!) 130   Resp: 16   SpO2: 100%            Physical Exam  Vitals signs and nursing note reviewed. Constitutional:       Appearance: Normal appearance. She is well-developed. Comments: tearful   HENT:      Head: Normocephalic and atraumatic. Eyes:      Conjunctiva/sclera: Conjunctivae normal.      Pupils: Pupils are equal, round, and reactive to light. Neck:      Musculoskeletal: Normal range of motion and neck supple. Cardiovascular:      Rate and Rhythm: Regular rhythm. Tachycardia present. Pulmonary:      Effort: Pulmonary effort is normal.      Breath sounds: Normal breath sounds. Abdominal:      General: Bowel sounds are normal.      Palpations: Abdomen is soft. Musculoskeletal: Normal range of motion. Skin:     General: Skin is warm and dry. Neurological:      Mental Status: She is alert and oriented to person, place, and time. Psychiatric:         Attention and Perception: Attention normal.         Mood and Affect: Mood is anxious. Behavior: Behavior is agitated. Cognition and Memory: Cognition normal.         Judgment: Judgment normal.          MDM  Number of Diagnoses or Management Options  Anxiety state:   Diagnosis management comments: 41-year-old female presenting for anxiety.   A quick review of the medical record reveals that she does have diagnosis of severe anxiety disorder. She is tachycardic but sinus on the monitor. Giving her a dose of Ativan and will reevaluate. Even in the time that I was speaking with her her heart rate was coming down her breathing was slowing and she seemed to be relaxing somewhat. Amount and/or Complexity of Data Reviewed  Clinical lab tests: ordered and reviewed (Results for orders placed or performed in visit on 12/17/19  -CBC WITH AUTOMATED DIFF       Result                      Value             Ref Range           WBC                         6.9               3.4 - 10.8 x*       RBC                         4.22              3.77 - 5.28 *       HGB                         12.9              11.1 - 15.9 *       HCT                         37.8              34.0 - 46.6 %       MCV                         90                79 - 97 fL          MCH                         30.6              26.6 - 33.0 *       MCHC                        34.1              31.5 - 35.7 *       RDW                         12.4              12.3 - 15.4 %       PLATELET                    302               150 - 450 x1*       NEUTROPHILS                 45                Not Estab. %        Lymphocytes                 44                Not Estab. %        MONOCYTES                   8                 Not Estab. %        EOSINOPHILS                 3                 Not Estab. %        BASOPHILS                   0                 Not Estab. %        ABS. NEUTROPHILS            3.1               1.4 - 7.0 x1*       Abs Lymphocytes             3.0               0.7 - 3.1 x1*       ABS. MONOCYTES              0.5               0.1 - 0.9 x1*       ABS. EOSINOPHILS            0.2               0.0 - 0.4 x1*       ABS. BASOPHILS              0.0               0.0 - 0.2 x1*       IMMATURE GRANULOCYTES       0                 Not Estab. %        ABS. IMM.  GRANS.            0.0               0.0 - 0.1 x1*  )    Risk of Complications, Morbidity, and/or Mortality  Presenting problems: moderate  Diagnostic procedures: moderate  Management options: moderate  General comments: I personally reviewed the patient's vital signs, laboratory tests, and/or radiological findings. I discussed these findings with the patient and their significance. I answered all questions and gave the patient clear return precautions.   The patient was discharged from the emergency department in stable condition        Patient Progress  Patient progress: improved         Procedures

## 2020-02-27 NOTE — ED NOTES
I have reviewed discharge instructions with the patient. The patient verbalized understanding. Patient left ED via Discharge Method: ambulatory to Home . Opportunity for questions and clarification provided. Patient given 1 scripts. To continue your aftercare when you leave the hospital, you may receive an automated call from our care team to check in on how you are doing. This is a free service and part of our promise to provide the best care and service to meet your aftercare needs.  If you have questions, or wish to unsubscribe from this service please call [phone]. Thank you for Choosing our [facility] Emergency Department.

## 2020-02-27 NOTE — LETTER
[address] [facility] EMERGENCY DEPT 
[address] [facility] [facility] 
[phone] Work Note Date: 2/27/2020 To Whom It May concern: 
 
Lisa Najera was seen and treated today in the emergency room by the following provider(s): 
Attending Provider: Beto Schmitt MD. Otter Reinaldo Return to work: 02/29/2020 Sincerely, 
 
 
 
 
Bryanna Little RN

## 2020-02-27 NOTE — ED TRIAGE NOTES
Pt arrives ambulatory to triage with complaint of anxiety attack that started two hours prior. Pt states she has had anxiety attacks before but that it has not gotten this bad in a very long time. Pt hyperventilating during triage.

## 2020-02-27 NOTE — DISCHARGE INSTRUCTIONS
Continue working with your outpatient therapy team and should develop coping mechanisms for anxiety.

## 2021-01-08 ENCOUNTER — APPOINTMENT (OUTPATIENT)
Dept: MRI IMAGING | Age: 36
End: 2021-01-08
Attending: EMERGENCY MEDICINE
Payer: COMMERCIAL

## 2021-01-08 ENCOUNTER — HOSPITAL ENCOUNTER (EMERGENCY)
Age: 36
Discharge: HOME | End: 2021-01-08
Attending: EMERGENCY MEDICINE | Admitting: EMERGENCY MEDICINE
Payer: COMMERCIAL

## 2021-01-08 VITALS
RESPIRATION RATE: 18 BRPM | SYSTOLIC BLOOD PRESSURE: 101 MMHG | HEART RATE: 67 BPM | OXYGEN SATURATION: 94 % | DIASTOLIC BLOOD PRESSURE: 71 MMHG

## 2021-01-08 DIAGNOSIS — G93.2 IDIOPATHIC INTRACRANIAL HYPERTENSION: Primary | ICD-10-CM

## 2021-01-08 LAB
ALBUMIN SERPL-MCNC: 3.6 G/DL (ref 3.5–5)
ALBUMIN/GLOB SERPL: 0.9 {RATIO} (ref 1.2–3.5)
ALP SERPL-CCNC: 72 U/L (ref 50–136)
ALT SERPL-CCNC: 17 U/L (ref 12–65)
ANION GAP SERPL CALC-SCNC: 6 MMOL/L (ref 7–16)
AST SERPL-CCNC: 10 U/L (ref 15–37)
BASOPHILS # BLD: 0 K/UL (ref 0–0.2)
BASOPHILS NFR BLD: 1 % (ref 0–2)
BILIRUB DIRECT SERPL-MCNC: 0.1 MG/DL
BILIRUB SERPL-MCNC: 0.4 MG/DL (ref 0.2–1.1)
BUN SERPL-MCNC: 9 MG/DL (ref 6–23)
CALCIUM SERPL-MCNC: 8.7 MG/DL (ref 8.3–10.4)
CHLORIDE SERPL-SCNC: 108 MMOL/L (ref 98–107)
CO2 SERPL-SCNC: 28 MMOL/L (ref 21–32)
CREAT SERPL-MCNC: 0.75 MG/DL (ref 0.6–1)
DIFFERENTIAL METHOD BLD: ABNORMAL
EOSINOPHIL # BLD: 0.1 K/UL (ref 0–0.8)
EOSINOPHIL NFR BLD: 2 % (ref 0.5–7.8)
ERYTHROCYTE [DISTWIDTH] IN BLOOD BY AUTOMATED COUNT: 11.8 % (ref 11.9–14.6)
GLOBULIN SER CALC-MCNC: 4.1 G/DL (ref 2.3–3.5)
GLUCOSE SERPL-MCNC: 76 MG/DL (ref 65–100)
HCT VFR BLD AUTO: 39.4 % (ref 35.8–46.3)
HGB BLD-MCNC: 12.9 G/DL (ref 11.7–15.4)
IMM GRANULOCYTES # BLD AUTO: 0 K/UL (ref 0–0.5)
IMM GRANULOCYTES NFR BLD AUTO: 0 % (ref 0–5)
INR PPP: 1.1
LYMPHOCYTES # BLD: 2.7 K/UL (ref 0.5–4.6)
LYMPHOCYTES NFR BLD: 49 % (ref 13–44)
MCH RBC QN AUTO: 30.9 PG (ref 26.1–32.9)
MCHC RBC AUTO-ENTMCNC: 32.7 G/DL (ref 31.4–35)
MCV RBC AUTO: 94.5 FL (ref 79.6–97.8)
MONOCYTES # BLD: 0.5 K/UL (ref 0.1–1.3)
MONOCYTES NFR BLD: 8 % (ref 4–12)
NEUTS SEG # BLD: 2.3 K/UL (ref 1.7–8.2)
NEUTS SEG NFR BLD: 40 % (ref 43–78)
NRBC # BLD: 0 K/UL (ref 0–0.2)
PLATELET # BLD AUTO: 288 K/UL (ref 150–450)
PMV BLD AUTO: 8.6 FL (ref 9.4–12.3)
POTASSIUM SERPL-SCNC: 3.9 MMOL/L (ref 3.5–5.1)
PROT SERPL-MCNC: 7.7 G/DL (ref 6.3–8.2)
PROTHROMBIN TIME: 14.6 SEC (ref 12.5–14.7)
RBC # BLD AUTO: 4.17 M/UL (ref 4.05–5.2)
SODIUM SERPL-SCNC: 142 MMOL/L (ref 136–145)
WBC # BLD AUTO: 5.6 K/UL (ref 4.3–11.1)

## 2021-01-08 PROCEDURE — 85025 COMPLETE CBC W/AUTO DIFF WBC: CPT

## 2021-01-08 PROCEDURE — 99203 OFFICE O/P NEW LOW 30 MIN: CPT | Performed by: PSYCHIATRY & NEUROLOGY

## 2021-01-08 PROCEDURE — 80048 BASIC METABOLIC PNL TOTAL CA: CPT

## 2021-01-08 PROCEDURE — A9575 INJ GADOTERATE MEGLUMI 0.1ML: HCPCS | Performed by: EMERGENCY MEDICINE

## 2021-01-08 PROCEDURE — 70553 MRI BRAIN STEM W/O & W/DYE: CPT

## 2021-01-08 PROCEDURE — 96374 THER/PROPH/DIAG INJ IV PUSH: CPT

## 2021-01-08 PROCEDURE — 85610 PROTHROMBIN TIME: CPT

## 2021-01-08 PROCEDURE — 99283 EMERGENCY DEPT VISIT LOW MDM: CPT

## 2021-01-08 PROCEDURE — 70544 MR ANGIOGRAPHY HEAD W/O DYE: CPT

## 2021-01-08 PROCEDURE — 80076 HEPATIC FUNCTION PANEL: CPT

## 2021-01-08 PROCEDURE — 74011250636 HC RX REV CODE- 250/636: Performed by: EMERGENCY MEDICINE

## 2021-01-08 RX ORDER — SODIUM CHLORIDE 0.9 % (FLUSH) 0.9 %
5-40 SYRINGE (ML) INJECTION EVERY 8 HOURS
Status: DISCONTINUED | OUTPATIENT
Start: 2021-01-08 | End: 2021-01-08 | Stop reason: HOSPADM

## 2021-01-08 RX ORDER — GADOTERATE MEGLUMINE 376.9 MG/ML
19 INJECTION INTRAVENOUS
Status: COMPLETED | OUTPATIENT
Start: 2021-01-08 | End: 2021-01-08

## 2021-01-08 RX ORDER — LORAZEPAM 2 MG/ML
1 INJECTION INTRAMUSCULAR
Status: COMPLETED | OUTPATIENT
Start: 2021-01-08 | End: 2021-01-08

## 2021-01-08 RX ORDER — ACETAZOLAMIDE 500 MG/1
500 CAPSULE, EXTENDED RELEASE ORAL 2 TIMES DAILY
Qty: 60 CAP | Refills: 3 | Status: SHIPPED | OUTPATIENT
Start: 2021-01-08 | End: 2021-01-22 | Stop reason: DRUGHIGH

## 2021-01-08 RX ORDER — SODIUM CHLORIDE 0.9 % (FLUSH) 0.9 %
5-40 SYRINGE (ML) INJECTION AS NEEDED
Status: DISCONTINUED | OUTPATIENT
Start: 2021-01-08 | End: 2021-01-08 | Stop reason: HOSPADM

## 2021-01-08 RX ORDER — SODIUM CHLORIDE 0.9 % (FLUSH) 0.9 %
10 SYRINGE (ML) INJECTION
Status: COMPLETED | OUTPATIENT
Start: 2021-01-08 | End: 2021-01-08

## 2021-01-08 RX ADMIN — GADOTERATE MEGLUMINE 19 ML: 376.9 INJECTION INTRAVENOUS at 11:35

## 2021-01-08 RX ADMIN — LORAZEPAM 1 MG: 2 INJECTION INTRAMUSCULAR; INTRAVENOUS at 10:27

## 2021-01-08 RX ADMIN — Medication 10 ML: at 11:35

## 2021-01-08 NOTE — ED PROVIDER NOTES
Healthy 50-year-old -American female reports that she has been having global headaches off and on for over the past year. Headache is fairly persistent but waxes and wanes. It is variable in location. At times she does have some vision dimming and is felt as though she will pass out. She has not actually completely lost her vision nor has she passed out. She has been using over-the-counter Tylenol with temporary improvement. Because of the vision changes she was seen by ophthalmology and at that time felt to have optic nerve edema and was asked to come to the emergency department for MRI. Patient did not come yesterday due to concern about ER volume. The headache is minimal.  No numbness or weakness in her extremities. Headache seems to be worse at night. The history is provided by the patient. Blurred Vision   Associated symptoms include blurred vision. Pertinent negatives include no numbness, no weakness and no fever.         Past Medical History:   Diagnosis Date    Anxiety     Depression     Diverticulitis 2011 2016    Diverticulosis 2011    Fibroadenoma of breast, right     Insomnia     Lumbar disc disorder     Mitral valve regurgitation 05/21/2018    1+    MVP (mitral valve prolapse) 05/21/2018    mild anterior leaflet MVP    Syncope     1st occur 2-3 months ag    Vitamin D deficiency 05/29/2018       Past Surgical History:   Procedure Laterality Date    BREAST SURGERY PROCEDURE UNLISTED Right 2014    biopsy    HX BREAST BIOPSY Right 2013    excision fibroadenoma    HX COLONOSCOPY  2016    Had in [place], has seen Dr Leatha Reddy         Family History:   Problem Relation Age of Onset    No Known Problems Mother     Hypertension Father     Cancer Paternal Aunt         breast cancer    Breast Cancer Paternal Aunt        Social History     Socioeconomic History    Marital status: SINGLE     Spouse name: Not on file    Number of children: 1    Years of education: 13    Highest education level: Not on file   Occupational History    Occupation: phlebotomist   Social Needs    Financial resource strain: Not on file    Food insecurity     Worry: Not on file     Inability: Not on file    Transportation needs     Medical: Not on file     Non-medical: Not on file   Tobacco Use    Smoking status: Former Smoker     Quit date: 2009     Years since quittin.6    Smokeless tobacco: Never Used    Tobacco comment: Pack-year history unknown. Substance and Sexual Activity    Alcohol use: No    Drug use: No    Sexual activity: Never   Lifestyle    Physical activity     Days per week: Not on file     Minutes per session: Not on file    Stress: Not on file   Relationships    Social connections     Talks on phone: Not on file     Gets together: Not on file     Attends Bahai service: Not on file     Active member of club or organization: Not on file     Attends meetings of clubs or organizations: Not on file     Relationship status: Not on file    Intimate partner violence     Fear of current or ex partner: Not on file     Emotionally abused: Not on file     Physically abused: Not on file     Forced sexual activity: Not on file   Other Topics Concern     Service No    Blood Transfusions No    Caffeine Concern No    Occupational Exposure Yes     Comment: phlebotomist.     Hobby Hazards No    Sleep Concern Yes    Stress Concern Yes    Weight Concern Yes    Special Diet No    Back Care Yes    Exercise No    Bike Helmet Not Asked    Seat Belt Yes    Self-Exams No   Social History Narrative    Multiple brothers and sister an [employer].     Son , 12 years, [employer]    Hep B series had 2nd, 3rd will be in aug         ALLERGIES: Patient has no known allergies. Review of Systems   Constitutional: Negative for fever. HENT: Negative for congestion. Eyes: Positive for blurred vision and visual disturbance. Respiratory: Negative for cough and shortness of breath. Cardiovascular: Negative for chest pain. Gastrointestinal: Negative for abdominal pain. Genitourinary: Negative for dysuria. Musculoskeletal: Negative for back pain and neck pain. Skin: Negative for rash. Neurological: Positive for headaches. Negative for weakness and numbness. Psychiatric/Behavioral: Negative for confusion. Vitals:    01/08/21 0755   BP: 122/78   Pulse: 98   Resp: 18   SpO2: 100%            Physical Exam  Vitals signs and nursing note reviewed. Constitutional:       General: She is not in acute distress. Appearance: Normal appearance. She is not toxic-appearing. HENT:      Head: Normocephalic and atraumatic. Nose: Nose normal.      Mouth/Throat:      Mouth: Mucous membranes are moist.      Pharynx: Oropharynx is clear. Eyes:      Extraocular Movements: Extraocular movements intact. Conjunctiva/sclera: Conjunctivae normal.      Pupils: Pupils are equal, round, and reactive to light. Neck:      Musculoskeletal: Normal range of motion and neck supple. Cardiovascular:      Rate and Rhythm: Normal rate and regular rhythm. Heart sounds: No murmur. Pulmonary:      Effort: Pulmonary effort is normal.      Breath sounds: Normal breath sounds. Abdominal:      General: There is no distension. Palpations: Abdomen is soft. Tenderness: There is no abdominal tenderness. There is no guarding. Musculoskeletal: Normal range of motion. General: No swelling or tenderness. Skin:     General: Skin is warm and dry. Neurological:      General: No focal deficit present. Mental Status: She is alert and oriented to person, place, and time. Cranial Nerves: No cranial nerve deficit. Sensory: No sensory deficit. Motor: No weakness.       Coordination: Coordination normal.      Deep Tendon Reflexes: Reflexes normal.   Psychiatric:         Mood and Affect: Mood normal.         Behavior: Behavior normal.          MDM  Number of Diagnoses or Management Options  Diagnosis management comments: Blood work is unremarkable. MRI shows findings concerning for idiopathic intracranial hypertension. Findings discussed with neurology who will evaluate the patient in the emergency department to assist with work-up and management.        Amount and/or Complexity of Data Reviewed  Clinical lab tests: ordered and reviewed  Tests in the radiology section of CPT®: ordered and reviewed  Discuss the patient with other providers: yes  Independent visualization of images, tracings, or specimens: yes    Risk of Complications, Morbidity, and/or Mortality  Presenting problems: moderate  Diagnostic procedures: moderate  Management options: moderate           Procedures

## 2021-01-08 NOTE — ED NOTES
I have reviewed discharge instructions with the patient. The patient verbalized understanding. Patient left ED via Discharge Method: ambulatory to Home with self. Opportunity for questions and clarification provided. Patient given 1 scripts. To continue your aftercare when you leave the hospital, you may receive an automated call from our care team to check in on how you are doing. This is a free service and part of our promise to provide the best care and service to meet your aftercare needs.  If you have questions, or wish to unsubscribe from this service please call [phone]. Thank you for Choosing our [facility] Emergency Department.

## 2021-01-08 NOTE — CONSULTS
Consult    Patient: Yelena Headley MRN: [id number]     YOB: 1985  Age: 35 y.o.  Sex: female      Subjective:      Yelena Headley is a 35 y.o. female who is being seen for headache.    The patient has been having headaches for approximately 1 year.  They are worse when she is laying flat.  The pain is throbbing.  She has associated sounds in her ears that she describes as \"it is like listening to the ocean\".  She also has intermittent episodes of blurry vision and blackening out of her vision when she stands.  She has had weight gain over the last few years.  She saw her eye doctor who noted swelling of the optic disc, bilaterally. The optometrist recommended she go to the emergency department to be assessed.  In the emergency department the patient had an MRI of the brain which shows characteristic features of idiopathic intracranial hypertension.  She also had an MRV performed which was negative.  She denies loss of peripheral vision.    Past Medical History:   Diagnosis Date   • Anxiety    • Depression    • Diverticulitis 2011   • Diverticulosis    • Fibroadenoma of breast, right    • Insomnia    • Lumbar disc disorder    • Mitral valve regurgitation 2018    1+   • MVP (mitral valve prolapse) 2018    mild anterior leaflet MVP   • Syncope     1st occur 2-3 months ag   • Vitamin D deficiency 2018     Past Surgical History:   Procedure Laterality Date   • BREAST SURGERY PROCEDURE UNLISTED Right     biopsy   • HX BREAST BIOPSY Right 2013    excision fibroadenoma   • HX COLONOSCOPY  2016    Had in [place], has seen Dr Phan      Family History   Problem Relation Age of Onset   • No Known Problems Mother    • Hypertension Father    • Cancer Paternal Aunt         breast cancer   • Breast Cancer Paternal Aunt      Social History     Tobacco Use   • Smoking status: Former Smoker     Quit date: 2009     Years since quittin.6   • Smokeless tobacco: Never Used   Tobacco comment: Pack-year history unknown. Substance Use Topics    Alcohol use: No      Current Facility-Administered Medications   Medication Dose Route Frequency Provider Last Rate Last Admin    sodium chloride (NS) flush 5-40 mL  5-40 mL IntraVENous Q8H Monica Hobson MD        sodium chloride (NS) flush 5-40 mL  5-40 mL IntraVENous PRN Monica Hobson MD         Current Outpatient Medications   Medication Sig Dispense Refill    acetaZOLAMIDE SR (Diamox Sequels) 500 mg capsule Take 1 Cap by mouth two (2) times a day. For 1 week take 1 tab at night then increase to 2 times per day. 60 Cap 3    hydrOXYzine HCL (ATARAX) 50 mg tablet Take 50 mg by mouth daily.  terconazole (TERAZOL 3) 0.8 % vaginal cream Insert 1 Applicator into vagina nightly. 20 g 5    clotrimazole-betamethasone (LOTRISONE) topical cream Apply  to affected area two (2) times a day. 15 g 5    FIBER CHOICE PO Take  by mouth.  diphenhydrAMINE (BENADRYL) 25 mg capsule Take 25 mg by mouth every six (6) hours as needed. Indications: \"not working\"      acetaminophen (TYLENOL) 325 mg tablet Take  by mouth every four (4) hours as needed for Pain.  melatonin (MELATIN PO) Take 40 mg by mouth daily. Indications: \"not working\"          No Known Allergies    Review of Systems:  CONSTITUTIONAL: No weight loss, fever, chills, weakness or fatigue. HEENT: Eyes: No visual loss, blurred vision, double vision or yellow sclerae. Ears, Nose, Throat: No hearing loss, sneezing, congestion, runny nose or sore throat. SKIN: No rash or itching. CARDIOVASCULAR: No chest pain, chest pressure or chest discomfort. No palpitations or edema. RESPIRATORY: No shortness of breath, cough or sputum. GASTROINTESTINAL: No anorexia, nausea, vomiting or diarrhea. No abdominal pain or blood. GENITOURINARY: no burning with urination. NEUROLOGICAL: Positive for headache.   No dizziness, syncope, paralysis, ataxia, numbness or tingling in the extremities. No change in bowel or bladder control.  MUSCULOSKELETAL: No muscle, back pain, joint pain or stiffness.  HEMATOLOGIC: No anemia, bleeding or bruising.  LYMPHATICS: No enlarged nodes. No history of splenectomy.  PSYCHIATRIC: No history of depression or anxiety.  ENDOCRINOLOGIC: No reports of sweating, cold or heat intolerance. No polyuria or polydipsia.  ALLERGIES: No history of asthma, hives, eczema or rhinitis.        Objective:     Vitals:    01/08/21 0755   BP: 122/78   Pulse: 98   Resp: 18   SpO2: 100%        Physical Exam:  General - Well developed, well nourished, in no apparent distress. Pleasant and conversant.   HEENT - Normocephalic, atraumatic. Conjunctiva, tympanic membranes, and oropharynx are clear.   Neck - Supple without masses, no bruits   Cardiovascular - Regular rate and rhythm. Normal S1, S2 without murmurs, rubs, or gallops.  Lungs - Clear to auscultation.  Abdomen - Soft, nontender with normal bowel sounds.   Extremities - Peripheral pulses intact. No edema and no rashes.     Neurological examination - Comprehension, attention , memory and reasoning are intact. Language and speech are normal. On cranial nerve examination pupils are equal round and reactive to light. Fundoscopic examination shows bilateral papilledema. Visual acuity is adequate. Visual fields are full to finger confrontation. Extraocular motility is normal. Face is symmetric and sensation is intact to light touch. Hearing is intact to finger rustle bilaterally. Motor examination - There is normal muscle tone and bulk. Power is full throughout. Muscle stretch reflexes are normoactive and there are no pathological reflexes present. Sensation is intact to light touch, pinprick, vibration and proprioception in all extremities. Cerebellar examination is normal. Gait and stance are normal.       Lab Results   Component Value Date/Time    Cholesterol, total 180 08/16/2019 08:40 AM    HDL Cholesterol 88 (H) 08/16/2019 08:40  AM    LDL, calculated 78.8 08/16/2019 08:40 AM    VLDL, calculated 13.2 08/16/2019 08:40 AM    Triglyceride 66 08/16/2019 08:40 AM    CHOL/HDL Ratio 2.0 08/16/2019 08:40 AM        Lab Results   Component Value Date/Time    Hemoglobin A1c 5.4 09/07/2018 09:55 AM        CT Results (most recent): Personally Reviewed   Results from Hospital Encounter encounter on 06/18/19   CT HEAD WO CONT    Narrative Examination: CT scan of the brain without contrast.    History: Headache, chronic, no new features, norm neuro exam, 34 years Female   Headache x 6 weeks everyday     Technique: 5 mm axial imaging of the brain from the posterior fossa to the  vertex.  Radiation dose reduction techniques were used for this study:  Our CT  scanners use one or all of the following: Automated exposure control, adjustment  of the mA and/or kVp according to patient's size, iterative reconstruction.    Comparison:  None available    Findings:  The brain parenchyma appears within normal limits for age.  The  ventricles, sulci are age-appropriate. No intracranial hemorrhage or extra-axial  collection is identified.  No evidence of acute infarct.  No mass effect or  midline shift is present. Basal cisterns are intact.  The visualized paranasal  sinuses and mastoid air cells are clear. The orbits, bones, and soft tissues are  normal in appearance.      Impression Impression:  Normal unenhanced CT of the brain for age.   No acute intracranial  abnormality.       Results for orders placed or performed in visit on 05/02/18   AMB POC EKG ROUTINE W/ 12 LEADS, INTER & REP    Impression    EKG: normal sinus rhythm, LVH by EKG criteria.            MRI Results (most recent): Personally Reviewed  Results from Hospital Encounter encounter on 01/08/21   MRV BRAIN WO CONT    Narrative HISTORY: 35 years of age Female with concern for dural venous sinus thrombosis..    EXAM/TECHNIQUE: MRV BRAIN WO CONT. Magnetic resonance venography of the head was  performed  without contrast. 3-D multiplanar reconstructions were performed at  the workstation. COMPARISON: No prior MRV or CTV imaging of the head available. MR brain exam on  1/8/2021 performed just subsequent to this study. FINDINGS:   The superior sagittal sinus is patent. The inferior sagittal sinus is patent. Straight sinus is patent. The right transverse sinus is patent. There is lack of signal within the  proximal right sigmoid sinus. The distal right sigmoid sinus is patent. The  right jugular bulb is patent. Visualized upper right internal jugular vein is  patent. The left transverse sinus is patent. There is lack of signal within the proximal  left sigmoid sinus which appears similar to that on the right. The distal left  sigmoid sinus is patent. Visualized upper left internal jugular vein is patent. On the same day contrasted MRA head examination the dural venous sinuses appear  patent with hypoplasia in the areas of signal loss within the very proximal  sigmoid sinuses. No definite evidence of dural venous sinus thrombosis. Impression IMPRESSION:   Negative MRV head exam for definite evidence of dural venous sinus thrombosis. 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 Most recent CTA Personally Reviewed  Results from [place] encounter on 06/18/19   CT HEAD WO CONT    Narrative Examination: CT scan of the brain without contrast.    History: Headache, chronic, no new features, norm neuro exam, 34 years Female   Headache x 6 weeks everyday     Technique: 5 mm axial imaging of the brain from the posterior fossa to the  vertex. Radiation dose reduction techniques were used for this study:  Our CT  scanners use one or all of the following: Automated exposure control, adjustment  of the mA and/or kVp according to patient's size, iterative reconstruction. Comparison:  None available    Findings:   The brain parenchyma appears within normal limits for age. The  ventricles, sulci are age-appropriate. No intracranial hemorrhage or extra-axial  collection is identified. No evidence of acute infarct. No mass effect or  midline shift is present. Basal cisterns are intact. The visualized paranasal  sinuses and mastoid air cells are clear. The orbits, bones, and soft tissues are  normal in appearance. Impression Impression:  Normal unenhanced CT of the brain for age. No acute intracranial  abnormality. MRI of the brain: Swelling consistent with idiopathic intracranial hypertension. Findings include empty sella and edema of the optic nerve sheaths. She also has low positioning of the cerebellar tonsils. Assessment:     Idiopathic intracranial hypertension    The patient symptoms and imaging characteristics are classic for idiopathic intracranial hypertension. This is likely been going on for about a year based on her symptoms. Given the very high probability of idiopathic intracranial hypertension and her low positioning of her cerebellar tonsils, a lumbar puncture is not warranted at this time. Plan:     Start Diamox 500 mg nightly. Increase to 500 mg twice daily after 1 week. This medicine can be difficult to tolerate. No plans on becoming pregnant. I counseled the patient regarding weight loss which can be curative. The patient will follow up with me in clinic within the next 30 days.     Signed By: Gabo Estrada, DO     January 8, 2021

## 2021-01-08 NOTE — ED TRIAGE NOTES
Patient ambulatory to triage without difficulty; mask in place. Patient states her eye doctor wanted her to come to get an MRI. Patient reports blurry vision and headaches. Patient reports seeing PCP about symptoms. Patient refuses to allow oral temperature to be taken in triage because she does not wish to remove her mask. Patient answers cell phone during triage, shown to room at this time.

## 2021-01-11 NOTE — PROGRESS NOTES
Called all 3 number on chart first [phone] voice mail not set up,1049 work not [id number] number disconnected

## 2021-03-05 ENCOUNTER — HOSPITAL ENCOUNTER (OUTPATIENT)
Dept: LAB | Age: 36
Discharge: HOME | End: 2021-03-05
Payer: COMMERCIAL

## 2021-03-05 DIAGNOSIS — Z51.81 THERAPEUTIC DRUG MONITORING: ICD-10-CM

## 2021-03-05 DIAGNOSIS — R51.9 INTRACTABLE HEADACHE, UNSPECIFIED CHRONICITY PATTERN, UNSPECIFIED HEADACHE TYPE: ICD-10-CM

## 2021-03-05 LAB
ALBUMIN SERPL-MCNC: 4 G/DL (ref 3.5–5)
ALBUMIN/GLOB SERPL: 1 {RATIO} (ref 1.2–3.5)
ALP SERPL-CCNC: 70 U/L (ref 50–136)
ALT SERPL-CCNC: 18 U/L (ref 12–65)
ANION GAP SERPL CALC-SCNC: 5 MMOL/L (ref 7–16)
AST SERPL-CCNC: 10 U/L (ref 15–37)
BILIRUB SERPL-MCNC: 0.6 MG/DL (ref 0.2–1.1)
BUN SERPL-MCNC: 10 MG/DL (ref 6–23)
CALCIUM SERPL-MCNC: 9.1 MG/DL (ref 8.3–10.4)
CHLORIDE SERPL-SCNC: 106 MMOL/L (ref 98–107)
CO2 SERPL-SCNC: 27 MMOL/L (ref 21–32)
CREAT SERPL-MCNC: 0.75 MG/DL (ref 0.6–1)
D DIMER PPP FEU-MCNC: <0.27 UG/ML(FEU)
GLOBULIN SER CALC-MCNC: 3.9 G/DL (ref 2.3–3.5)
GLUCOSE SERPL-MCNC: 90 MG/DL (ref 65–100)
POTASSIUM SERPL-SCNC: 3.4 MMOL/L (ref 3.5–5.1)
PROT SERPL-MCNC: 7.9 G/DL (ref 6.3–8.2)
SODIUM SERPL-SCNC: 138 MMOL/L (ref 136–145)

## 2021-03-05 PROCEDURE — 85379 FIBRIN DEGRADATION QUANT: CPT

## 2021-03-05 PROCEDURE — 80053 COMPREHEN METABOLIC PANEL: CPT

## 2021-05-29 ENCOUNTER — APPOINTMENT (OUTPATIENT)
Dept: GENERAL RADIOLOGY | Age: 36
End: 2021-05-29
Attending: EMERGENCY MEDICINE
Payer: COMMERCIAL

## 2021-05-29 ENCOUNTER — HOSPITAL ENCOUNTER (EMERGENCY)
Age: 36
Discharge: HOME | End: 2021-05-30
Payer: COMMERCIAL

## 2021-05-29 DIAGNOSIS — R42 VERTIGO: Primary | ICD-10-CM

## 2021-05-29 LAB
ALBUMIN SERPL-MCNC: 4 G/DL (ref 3.5–5)
ALBUMIN/GLOB SERPL: 0.9 {RATIO} (ref 1.2–3.5)
ALP SERPL-CCNC: 80 U/L (ref 50–136)
ALT SERPL-CCNC: 17 U/L (ref 12–65)
ANION GAP SERPL CALC-SCNC: 6 MMOL/L (ref 7–16)
AST SERPL-CCNC: 10 U/L (ref 15–37)
BASOPHILS # BLD: 0 K/UL (ref 0–0.2)
BASOPHILS NFR BLD: 1 % (ref 0–2)
BILIRUB SERPL-MCNC: 0.5 MG/DL (ref 0.2–1.1)
BUN SERPL-MCNC: 11 MG/DL (ref 6–23)
CALCIUM SERPL-MCNC: 9.7 MG/DL (ref 8.3–10.4)
CHLORIDE SERPL-SCNC: 106 MMOL/L (ref 98–107)
CO2 SERPL-SCNC: 27 MMOL/L (ref 21–32)
CREAT SERPL-MCNC: 0.91 MG/DL (ref 0.6–1)
DIFFERENTIAL METHOD BLD: ABNORMAL
EOSINOPHIL # BLD: 0.1 K/UL (ref 0–0.8)
EOSINOPHIL NFR BLD: 2 % (ref 0.5–7.8)
ERYTHROCYTE [DISTWIDTH] IN BLOOD BY AUTOMATED COUNT: 11.9 % (ref 11.9–14.6)
GLOBULIN SER CALC-MCNC: 4.5 G/DL (ref 2.3–3.5)
GLUCOSE BLD STRIP.AUTO-MCNC: 119 MG/DL (ref 65–100)
GLUCOSE SERPL-MCNC: 118 MG/DL (ref 65–100)
HCT VFR BLD AUTO: 41.3 % (ref 35.8–46.3)
HGB BLD-MCNC: 13.8 G/DL (ref 11.7–15.4)
IMM GRANULOCYTES # BLD AUTO: 0 K/UL (ref 0–0.5)
IMM GRANULOCYTES NFR BLD AUTO: 0 % (ref 0–5)
LYMPHOCYTES # BLD: 3.6 K/UL (ref 0.5–4.6)
LYMPHOCYTES NFR BLD: 49 % (ref 13–44)
MCH RBC QN AUTO: 30.9 PG (ref 26.1–32.9)
MCHC RBC AUTO-ENTMCNC: 33.4 G/DL (ref 31.4–35)
MCV RBC AUTO: 92.4 FL (ref 79.6–97.8)
MONOCYTES # BLD: 0.5 K/UL (ref 0.1–1.3)
MONOCYTES NFR BLD: 6 % (ref 4–12)
NEUTS SEG # BLD: 3.1 K/UL (ref 1.7–8.2)
NEUTS SEG NFR BLD: 43 % (ref 43–78)
NRBC # BLD: 0 K/UL (ref 0–0.2)
PLATELET # BLD AUTO: 299 K/UL (ref 150–450)
PMV BLD AUTO: 8.4 FL (ref 9.4–12.3)
POTASSIUM SERPL-SCNC: 4.1 MMOL/L (ref 3.5–5.1)
PROT SERPL-MCNC: 8.5 G/DL (ref 6.3–8.2)
RBC # BLD AUTO: 4.47 M/UL (ref 4.05–5.2)
SERVICE CMNT-IMP: ABNORMAL
SODIUM SERPL-SCNC: 139 MMOL/L (ref 136–145)
WBC # BLD AUTO: 7.4 K/UL (ref 4.3–11.1)

## 2021-05-29 PROCEDURE — 96360 HYDRATION IV INFUSION INIT: CPT

## 2021-05-29 PROCEDURE — 85025 COMPLETE CBC W/AUTO DIFF WBC: CPT

## 2021-05-29 PROCEDURE — 93005 ELECTROCARDIOGRAM TRACING: CPT

## 2021-05-29 PROCEDURE — 80053 COMPREHEN METABOLIC PANEL: CPT

## 2021-05-29 PROCEDURE — 99285 EMERGENCY DEPT VISIT HI MDM: CPT

## 2021-05-29 PROCEDURE — 82962 GLUCOSE BLOOD TEST: CPT

## 2021-05-29 PROCEDURE — 71045 X-RAY EXAM CHEST 1 VIEW: CPT

## 2021-05-29 RX ORDER — SODIUM CHLORIDE 0.9 % (FLUSH) 0.9 %
5-10 SYRINGE (ML) INJECTION EVERY 8 HOURS
Status: DISCONTINUED | OUTPATIENT
Start: 2021-05-29 | End: 2021-05-30 | Stop reason: HOSPADM

## 2021-05-29 RX ORDER — SODIUM CHLORIDE 0.9 % (FLUSH) 0.9 %
5-10 SYRINGE (ML) INJECTION AS NEEDED
Status: DISCONTINUED | OUTPATIENT
Start: 2021-05-29 | End: 2021-05-30 | Stop reason: HOSPADM

## 2021-05-29 NOTE — LETTER
[facility] EMERGENCY DEPT 
[address] [facility] 
[phone] Work/School Note Date: 5/29/2021 To Whom It May concern: 
 
Lb Ga was seen and treated today in the emergency room by the following provider(s): 
Attending Provider: Gayle Rios MD. Lb Ga may return to work on Tuesday 6/1/2021.  
 
Sincerely, 
 
 
 
 
Amy Glass, RN, BSN, MOHAN

## 2021-05-30 ENCOUNTER — APPOINTMENT (OUTPATIENT)
Dept: CT IMAGING | Age: 36
End: 2021-05-30
Payer: COMMERCIAL

## 2021-05-30 VITALS
BODY MASS INDEX: 30.46 KG/M2 | HEART RATE: 79 BPM | WEIGHT: 201 LBS | TEMPERATURE: 97.9 F | RESPIRATION RATE: 16 BRPM | OXYGEN SATURATION: 99 % | DIASTOLIC BLOOD PRESSURE: 56 MMHG | HEIGHT: 68 IN | SYSTOLIC BLOOD PRESSURE: 96 MMHG

## 2021-05-30 LAB
ATRIAL RATE: 75 BPM
CALCULATED P AXIS, ECG09: 68 DEGREES
CALCULATED R AXIS, ECG10: 77 DEGREES
CALCULATED T AXIS, ECG11: 60 DEGREES
DIAGNOSIS, 93000: NORMAL
P-R INTERVAL, ECG05: 162 MS
Q-T INTERVAL, ECG07: 362 MS
QRS DURATION, ECG06: 98 MS
QTC CALCULATION (BEZET), ECG08: 404 MS
TROPONIN-HIGH SENSITIVITY: 5 PG/ML (ref 0–14)
VENTRICULAR RATE, ECG03: 75 BPM

## 2021-05-30 PROCEDURE — 74011250636 HC RX REV CODE- 250/636

## 2021-05-30 PROCEDURE — 70450 CT HEAD/BRAIN W/O DYE: CPT

## 2021-05-30 PROCEDURE — 84484 ASSAY OF TROPONIN QUANT: CPT

## 2021-05-30 RX ORDER — MECLIZINE HYDROCHLORIDE 25 MG/1
25 TABLET ORAL
Qty: 21 TABLET | Refills: 0 | Status: SHIPPED | OUTPATIENT
Start: 2021-05-30

## 2021-05-30 RX ORDER — MECLIZINE HYDROCHLORIDE 25 MG/1
25 TABLET ORAL
Status: COMPLETED | OUTPATIENT
Start: 2021-05-30 | End: 2021-05-30

## 2021-05-30 RX ADMIN — SODIUM CHLORIDE 1000 ML: 900 INJECTION, SOLUTION INTRAVENOUS at 02:53

## 2021-05-30 RX ADMIN — MECLIZINE HYDROCHLORIDE 25 MG: 25 TABLET ORAL at 02:54

## 2021-05-30 NOTE — ED NOTES
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 Stated she's had a hx of \"idiopathic brain hypertension \" states she sees her PCP for this and was put in meds for it. Stated she received a recent dose increase of this meds because they were not helping. Pt stated she felt worse at work today, felt dizzy and stated she was feeling like gravity was weighting down on her. Denied CP, SOB, LOC or any other complaints at this time.

## 2021-05-30 NOTE — ED PROVIDER NOTES
44-year-old female with a history of migraines who works in the lab third shift did not sleep well today due to a headache. She recently had her Topamax increased to 150 a day but still had a headache today she took ibuprofen and Tylenol when she got to work but did not feel any better. She is also having brief periods of \"blackouts\". She does not fall down she just things \"go blank for a second\". Other  This is a new problem. The problem occurs constantly. The problem has not changed since onset. Pertinent negatives include no chest pain and no abdominal pain. Nothing aggravates the symptoms. Nothing relieves the symptoms. She has tried nothing for the symptoms. Past Medical History:   Diagnosis Date    Anxiety     Depression     Diverticulitis 2011    Diverticulosis     Fibroadenoma of breast, right     Insomnia     Lumbar disc disorder     Mitral valve regurgitation 2018    1+    MVP (mitral valve prolapse) 2018    mild anterior leaflet MVP    Syncope     1st occur 2-3 months ag    Vitamin D deficiency 2018       Past Surgical History:   Procedure Laterality Date    HX BREAST BIOPSY Right 2013    excision fibroadenoma    HX COLONOSCOPY  2016    Had in [place], has seen Dr Chayo Bee Right 2014    biopsy         Family History:   Problem Relation Age of Onset    No Known Problems Mother     Hypertension Father     Cancer Paternal Aunt         breast cancer    Breast Cancer Paternal Aunt        Social History     Socioeconomic History    Marital status: SINGLE     Spouse name: Not on file    Number of children: 1    Years of education: 13    Highest education level: Not on file   Occupational History    Occupation: phlebotomist   Tobacco Use    Smoking status: Former Smoker     Quit date: 2009     Years since quittin.0    Smokeless tobacco: Never Used    Tobacco comment: Pack-year history unknown. Substance and Sexual Activity    Alcohol use: No    Drug use: No    Sexual activity: Never   Other Topics Concern     Service No    Blood Transfusions No    Caffeine Concern No    Occupational Exposure Yes     Comment: phlebotomist.    Marya Sharma Hazards No    Sleep Concern Yes    Stress Concern Yes    Weight Concern Yes    Special Diet No    Back Care Yes    Exercise No    Bike Helmet Not Asked    Seat Belt Yes    Self-Exams No   Social History Narrative    Multiple brothers and sister an [employer].     Son , 12 years, [employer]    Hep B series had 2nd, 3rd will be in aug     Social Determinants of Health     Financial Resource Strain:     Difficulty of Paying Living Expenses:    Food Insecurity:     Worried About Running Out of Food in the Last Year:     [address] in the Last Year:    Transportation Needs:     Lack of Transportation (Medical):  Lack of Transportation (Non-Medical):    Physical Activity:     Days of Exercise per Week:     Minutes of Exercise per Session:    Stress:     Feeling of Stress :    Social Connections:     Frequency of Communication with Friends and Family:     Frequency of Social Gatherings with Friends and Family:     Attends [employer] Services:     Active Member of Clubs or Organizations:     Attends Club or Organization Meetings:     Marital Status:    Intimate Partner Violence:     Fear of Current or Ex-Partner:     Emotionally Abused:     Physically Abused:     Sexually Abused: ALLERGIES: Patient has no known allergies. Review of Systems   Constitutional: Negative. Negative for activity change. HENT: Negative. Eyes: Negative. Respiratory: Negative. Cardiovascular: Negative. Negative for chest pain. Gastrointestinal: Negative. Negative for abdominal pain. Genitourinary: Negative. Musculoskeletal: Negative. Skin: Negative. Neurological: Negative. Psychiatric/Behavioral: Negative.     All other systems reviewed and are negative. Vitals:    05/29/21 2123 05/29/21 2223 05/30/21 0032   BP: (!) 144/84 129/74 (!) 146/63   Pulse: 88 72 63   Resp: 20 18    Temp: 98.7 °F (37.1 °C)     SpO2: 99% 100% 99%   Weight: 91.2 kg (201 lb)     Height: 5' 8\" (1.727 m)              Physical Exam  Vitals and nursing note reviewed. Constitutional:       General: She is not in acute distress. Appearance: She is well-developed. HENT:      Head: Normocephalic and atraumatic. Right Ear: External ear normal.      Left Ear: External ear normal.      Nose: Nose normal.   Eyes:      General: No scleral icterus. Right eye: No discharge. Left eye: No discharge. Conjunctiva/sclera: Conjunctivae normal.      Pupils: Pupils are equal, round, and reactive to light. Cardiovascular:      Rate and Rhythm: Regular rhythm. Pulmonary:      Effort: Pulmonary effort is normal. No respiratory distress. Breath sounds: Normal breath sounds. No stridor. No wheezing or rales. Abdominal:      General: Bowel sounds are normal. There is no distension. Palpations: Abdomen is soft. Tenderness: There is no abdominal tenderness. Musculoskeletal:         General: Normal range of motion. Cervical back: Normal range of motion. Skin:     General: Skin is warm and dry. Findings: No rash. Neurological:      Mental Status: She is alert and oriented to person, place, and time. Motor: No abnormal muscle tone. Coordination: Coordination normal.   Psychiatric:         Behavior: Behavior normal.          MDM  Number of Diagnoses or Management Options  Vertigo  Diagnosis management comments: Patient had vertigo type symptoms while in the emergency department. She was given IV fluids and Antivert. Patient symptoms improved. Her CT of the head was negative for acute changes. Laboratory data was reassuring. Placement was not tachycardic tachypneic or hypoxic.   We will send her home with Antivert drink plenty of fluids contact your neurologist first thing Tuesday morning.        Amount and/or Complexity of Data Reviewed  Clinical lab tests: ordered and reviewed  Tests in the radiology section of CPT®: ordered and reviewed  Tests in the medicine section of CPT®: ordered and reviewed  Decide to obtain previous medical records or to obtain history from someone other than the patient: yes  Review and summarize past medical records: yes  Independent visualization of images, tracings, or specimens: yes    Risk of Complications, Morbidity, and/or Mortality  Presenting problems: high  Diagnostic procedures: high  Management options: high    Patient Progress  Patient progress: stable         Procedures

## 2021-05-30 NOTE — ED NOTES
Verbal report received from SHARON Gray for continuation of patient care upon shift change. Patient care transferred at this time.

## 2021-05-30 NOTE — ED NOTES
I have reviewed discharge instructions with the patient. The patient verbalized understanding. Patient left ED via Discharge Method: wheelchair to Home with self. Opportunity for questions and clarification provided. Patient given 1 scripts. To continue your aftercare when you leave the hospital, you may receive an automated call from our care team to check in on how you are doing. This is a free service and part of our promise to provide the best care and service to meet your aftercare needs.  If you have questions, or wish to unsubscribe from this service please call [phone]. Thank you for Choosing our [facility] Emergency Department.

## 2021-06-01 ENCOUNTER — PATIENT OUTREACH (OUTPATIENT)
Dept: OTHER | Age: 36
End: 2021-06-01

## 2021-06-01 NOTE — ACP (ADVANCE CARE PLANNING)
Advance Care Planning   Healthcare Decision Maker:         Click here to complete [address] including selection of the Healthcare Decision Maker Relationship (ie \"Primary\")  Today we discussed [address]. The patient is considering options.

## 2021-06-01 NOTE — PROGRESS NOTES
Received patient referral for [facility] Employee care management (HOPR) outreach. Review of EHR identifies need for follow up with Visual Field exam 2021;    DANIELLE: 41%    Care Manager contacted the patient, verified  and zip code as identifiers. Provided introduction and explanation of the Nurse Care Manager role. Patient agreed to CM follow-up and support at this time. PMH:   Past Medical History:   Diagnosis Date    Anxiety     Depression     Diverticulitis 2011    Diverticulosis     Fibroadenoma of breast, right     Insomnia     Lumbar disc disorder     Mitral valve regurgitation 2018    1+    MVP (mitral valve prolapse) 2018    mild anterior leaflet MVP    Syncope     1st occur 2-3 months ag    Vitamin D deficiency 2018       Patient has significant diagnosis of Intracranial Hypertension and possible Chiari 1 malformation and prominent fluid within the optic nerve sheaths bilaterally, Papilledema of both eyes; Care management assessment completed:    30 yo female presented to [facility] ER on 21 for worsening migraine headaches;    ED Labs:  Protein 8. 5(H); Globulin 4. 5(H); Last ED Visit:  21:  Blurry vision, persistent global headache pain, seen by Ophthalmologist sent to ER with Optic Nerve edema; Feeling of pins and needles in feet, weakness;    MRI Brain on 21:  The constellation of features favors idiopathic intracranial hypertension,  including a partially empty sella, prominent fluid within the optic nerve  sheaths bilaterally, and low positioning of the cerebellar tonsils. The low  positioning of the cerebellar tonsils could also be consequent to a congenital  Chiari I malformation, with superimposed idiopathic intracranial hypertension.      Initial Care Plan:    Goal:  Demonstrates no signs of complications or red flags in 30 days;   Reviewed some steps in ICP action plan for information and red flags   Reviewed monitoring plan for action when patient/family notes these changes to call Neurology immediately or call 911:  o Monitor for signs of increased ICP / Red Flags  o Worsening headache pain uncontrolled by medication regimen  o Nausea and or vomiting  o Increased BP  o Decreased mental abilities, drowsiness, restlessness  o Confusion about time, and then to location and people as the pressure worsens  o Double Vision, blurry vision  o Changes in breathing   Measure which may decrease ICP  o Elevate the head of the bed to 30-45 degrees to improve venous outflow  o Avoid over-heating or hyperthermia  o Optimize BP levels to avoid very low or very high BP  o Keep oxygen saturation levels above 90%  o Provide optimal pain relief    Goal:  Verbalizes decreased # Migraine HA Days while correctly taking treatment regimen;   Acute headache Pain   · Neurology Tried and Failed:    · Diamox, unable to tolerate  · Topamax - not helping much  · Methazolamide - reports she could not afford   · [facility] to wrong [facility] - [facility]  · Will ask for new order for Employee [facility] pharmacy  · Dr Davy William increased Topamax AM dose 100mg and PM dose to 50 mg;  · Patient reports no improvement, worsening dizziness  · Encouraged her to call Dr Arcelia Palomo office to explain how she is feeling and about her ER visit this weekend;  · Patient called and spoke with this staff  · Decreased Topamax dosages 50mg AM / 50mg PM;  · Planning an Aimovig inj. in his office tomorrow;   · Repeat of MRI Brain for changes  · Will refer to Neurosurgery if continues to show significant changes;     Goal:  Demonstrate behaviors that enable safe performance of daily acitivites  · Dizziness and blacking out, zoning out occurring more frequently recently  · Reports driving and blacked out about two weeks ago;  · Reports \"tumbling\" down a few times today - feels its all the medication together;  · \"Feels like gravity is pulling me down\"  · Prevent any falls by sitting or laying down  · Do not drive when feeling dizzy or taking all of your medication during day - Antivert, Motrin, Tylenol and Topamax;    · No dizziness at rest - separate from headaches;    Goal:  Completes all FU appointments as ordered   · Neuro-Ophthalmologist Dr Adriana Mcbride March 2021   Papilledema both eyes  · Needs FU visual fields every 3 months  · Check Disc OCT every three months  · Topamax - add Potassium  · Appears [facility] Yaakov Horvath at [facility] may have reftred  · Looking for new neuro ophthalmologist    Emotional Status/Adjustment to Health State: Anxious    Barriers/Challenges to Care:   []  Decline in memory    []  Language barrier  []  Emotional   []  Limited mobility  []  Lack of motivation     [x] Vision, hearing or cognitive impairment  [x]  Knowledge    [] Financial Barriers     []  Other       Current Outpatient Medications   Medication Sig    meclizine (ANTIVERT) 25 mg tablet Take 1 Tablet by mouth three (3) times daily as needed for Dizziness or Nausea for up to 21 doses.  amoxicillin (AMOXIL) 500 mg capsule Take 500 mg by mouth three (3) times daily.  ibuprofen (MOTRIN PO) Take 600 mg by mouth daily.  acetaminophen (Tylenol Extra Strength) 500 mg tablet Take 500 mg by mouth daily.  topiramate (TOPAMAX) 50 mg tablet 2 tablets in the morning and 1 tablet at night    multivitamin (ONE A DAY) tablet Take 1 Tab by mouth daily. No current facility-administered medications for this visit. Completed a review of medications with patient, who verbalized understanding of how and when to take medications. Barriers / Adherence with medications:   None identified;    Upcoming appointments:   Future Appointments   Date Time Provider Trinidad Serra   6/7/2021  3:30 PM Leopoldo Falconer, NP SSA IMD IMD   8/17/2021  8:45 AM Preston Watts DO BSND BSND       Patient verbalized understanding of all information discussed.   Pt has my contact information for any further questions, concerns, or needs.    Plan FU on Wednesday morning after Aimovig injection and to support self mgmt with HA, possible hold Motrin for rebounding and check on renewed prescription at [facility] for Methazolamide;

## 2021-06-03 ENCOUNTER — PATIENT OUTREACH (OUTPATIENT)
Dept: OTHER | Age: 36
End: 2021-06-03

## 2021-06-03 NOTE — PROGRESS NOTES
Care Manager contacted the patient by telephone in follow up. Verified  and zip code with patient as identifiers. 28 yo female presented to [facility] ER on 21 for worsening migraine headaches unrelieved by current pain regimen;  ED Labs:  Protein 8. 5(H); Globulin 4. 5(H);     Last ED Visit:  21:  Blurry vision, persistent global headache pain, seen by Ophthalmologist sent to ER with Optic Nerve edema; Feeling of pins and needles in feet, weakness;     MRI Brain on 21:    idiopathic intracranial hypertension, including a partially empty sella, prominent fluid within the optic nerve sheaths bilaterally, and low positioning of the cerebellar tonsils; The low positioning of the cerebellar tonsils could also be consequent to a congenital  Chiari I malformation, with superimposed idiopathic intracranial hypertension. Review of EHR identifies need for follow up with Visual Field exam 2021;  DANIELLE:   41%    Assessment of clinical changes and knowledge demonstrated since last call:   Ongoing Plan of Care:     Goal:  Verbalizes decreased # Migraine HA Days while correctly taking treatment regimen;  · Acute headache Pain   ? Reports feeling like her HA pain better controlled this morning;  · HA is still there, always there, just less pain;  · Have been able to be more active, less dizziness as well;  · She is holding her Antivert today;   ?  Neurology Tried and Failed:    § Diamox, unable to tolerate  § Topamax - not helping much per patient  § Methazolamide - reports she could not afford - [facility]          § CM spoke with Dr Nolberto Willis, explained problem with cost   § Provided Dr. Nolberto Willis with two places to send a new order if he wants to try that medication again;  § Patient aware;  · Patient spoke with Dr. Jennifer Ernst office about her increased pain after our last call;  · Decreased Topamax dosages 50mg AM / 50mg PM (add Potassium);  · Received a sample self-inj of Glenn Delgado in office yesterday;   · MRI Brain for changes - scheduled 06/14/21;  · Plans referreal to Neurosurgery if shows significant changes;     Goal:  Demonstrate behaviors that enable safe performance of daily acitivites  · Dizziness and blacking out, zoning out occurring more frequently recently  · Reports driving and blacked out about two weeks ago;  · Reports \"tumbling\" down a few times today - feels its all the medication together;  · \"Feels like gravity is pulling me down\"  · Discussed importance of action plan when she starts to feel this way  · Immediately sit or lay down  · Do not drive, or immediately pull over in safe place on side of road;   · Reports no dizziness at rest;      Goal:  Completes all FU appointments as ordered   · Neuro-Ophthalmologist Dr Karla Howell in March 2021 - FU due June 2021  · Papilledema both eyes, for FU visual fields every 3 months  · Check Disc OCT every three months  · Appears Dr Kimberly Lam at [facility] may have retired? § Looking for new neuro-ophthalmologist?  · Outreach call placed to [facility] and [facility] for Bhumika Diaz, who appeared to be looking for a new in network Neuro-ophthalmologist for this patient    Goal:  Demonstrates no signs of complications or red flags in 30 days;  · Reviewed some steps in ICP action plan for information and red flags  · Reviewed monitoring plan for action when patient/family notes these changes to call Neurology immediately or call 911:  ? Monitor for signs of increased ICP / Red Flags  ? Worsening headache pain uncontrolled by medication regimen  ? Nausea and or vomiting  ? Increased BP  ? Decreased mental abilities, drowsiness, restlessness  ? Confusion about time, and then to location and people as the pressure worsens  ? Double Vision, blurry vision  ? Changes in breathing  · Measure which may decrease ICP  ? Elevate the head of the bed to 30-45 degrees to improve venous outflow  ? Avoid over-heating or hyperthermia  ?  Optimize BP levels to avoid very low or very high BP  ? Keep oxygen saturation levels above 90%  ? Provide optimal pain relief     Review and discussion of plan of care with patient, who has provided input to plan, verbalized understanding and agrees with current goals. Any recurrence Red Flags or continued symptoms: Symptoms continue, no red flags    Medication Regimen Change:  Topamax dosage  Completed a review of medications with patient, who verbalized understanding of how and when to take medications. Barriers / Adherence with medications:  yes   Didn't understand nuances of employee pharmacy benefits - education provided    Upcoming Appointments:    Future Appointments   Date Time Provider Trinidad Serra   6/7/2021  3:30 PM J Luis Bhat NP SSA IMD IMD   6/14/2021  6:30 AM SFD MRI UNIT 1 SFDRMRI SFD   8/17/2021  8:45 AM Mónica Espinosa DO BSND BSND       Patient asked questions appropriately and denied any additional needs at this time. Patient verbalized understanding of all information discussed. Patient has my name and contact information for any follow up needs or questions.      Plan next call:   FU call in AM to check on pain control,

## 2021-06-04 ENCOUNTER — PATIENT OUTREACH (OUTPATIENT)
Dept: OTHER | Age: 36
End: 2021-06-04

## 2021-06-04 NOTE — PROGRESS NOTES
Incoming call from Orion at [facility]; Reports she has been trying to reach this patient for Eye doctor follow up. Dr. Kayleigh Castro, Neuro-Ophthlamologist has left the practice. He is now with [address] [place] [address]. Patient will need follow up appointment in June for her next VF check up as ordered from their last OV;   Will FU with patient;

## 2021-06-08 ENCOUNTER — PATIENT OUTREACH (OUTPATIENT)
Dept: OTHER | Age: 36
End: 2021-06-08

## 2021-06-08 NOTE — PROGRESS NOTES
Care Manager contacted the patient by telephone in follow up. Verified  and zip code with patient as identifiers. 28 yo female presented to [facility] ER on 21 for worsening migraine headaches unrelieved by current pain regimen;  ED Labs:  Protein 8. 5(H);  Globulin 4. 5(H); Assessment of clinical changes and knowledge demonstrated since last call:   Ongoing Plan of Care:     Goal:  Verbalizes decreased # Migraine HA Days while correctly taking treatment regimen;  · Acute headache Pain   ? Reports feeling HA pain better controlled;  · HA pain still there, not gone, but functioning better today  · Able to work yesterday but was exhausted end of day;  ? Neurology Tried and Failed:    § Diamox - unable to tolerate  § Topamax - stopped, not helping  Brianne Chong- Dr Sowmya Gupta re-prescribed  § Picking up to start today;  § Hopeful this will help as well;  · Patient spoke with Dr. Jace Mckeon office about her increased pain after our last call;  · Decreased Topamax dosages 50mg AM / 50mg PM (add Potassium);  · Received a sample self-inj of Ivana Simpson in office yesterday;   · Feel this has really improved her pain control;  · MRI Brain for changes - scheduled 21;  · Plans referreal to Neurosurgery if shows significant changes;     Goal:  Demonstrate behaviors that enable safe performance of daily acitivites  · Dizziness resolved off Antivert and Topiramate  · Had blacked out while driving two weeks ago;  ? Feels like a \"tumbling\" down sensation when occurs;  ? \"Feels like gravity is pulling me down\"  · Discussed importance of action plan when she starts to feel this way  ? Immediately sit or lay down  · Do not drive, or immediately pull over in safe place on side of road;   · Reports no dizziness at rest;      Goal:  Completes all FU appointments as ordered   · Neuro-Ophthalmologist Dr Magnolia Walter 2021 - FU due 2021  ? Papilledema both eyes, for FU visual fields every 3 months  ?  Check Disc OCT every three months  · Appears Dr Prasanna Bryson at [facility] may have retired? § Looking for new neuro-ophthalmologist?  · Outreach call placed to [facility] and [facility] for Ron Cisneros, who appeared to be looking for a new in network Neuro-ophthalmologist for this patient  · Did not attend PCP appt yesterday - considered a No Show;     Goal:  Demonstrates no signs of complications or red flags in 30 days;  · Reviewed some steps in ICP action plan for information and red flags  · Reviewed monitoring plan for action when patient/family notes these changes to call Neurology immediately or call 911:  · Monitoring for change in symptoms, Red Flags    Review and discussion of plan of care with patient, who has provided input to plan, verbalized understanding and agrees with current goals. Any recurrence Red Flags or continued symptoms:   Constant dull HA, no dizziness;     Medication Regimen Change:  Stopped Topamax, Start Methazolamide  Completed a review of medications with patient, who verbalized understanding of how and when to take medications. Barriers / Adherence with medications:  No barriers     Upcoming Appointments:    Future Appointments   Date Time Provider Trinidad Serra   6/14/2021  6:30 AM D MRI UNIT 1 DRMRI D   8/17/2021  8:45 AM Shira Barros, DO BSND BSND       Patient asked questions appropriately and denied any additional needs at this time. Patient verbalized understanding of all information discussed. Patient has my name and contact information for any follow up needs or questions.      Plan next call:   FU outreach in 3-4 days for symptoms after start of new medication today;

## 2021-06-14 ENCOUNTER — HOSPITAL ENCOUNTER (OUTPATIENT)
Dept: MRI IMAGING | Age: 36
Discharge: HOME | End: 2021-06-14
Attending: PSYCHIATRY & NEUROLOGY
Payer: COMMERCIAL

## 2021-06-14 DIAGNOSIS — R55 SYNCOPE, UNSPECIFIED SYNCOPE TYPE: ICD-10-CM

## 2021-06-14 DIAGNOSIS — G93.5 CHIARI MALFORMATION TYPE I (HCC): ICD-10-CM

## 2021-06-14 DIAGNOSIS — G93.2 IDIOPATHIC INTRACRANIAL HYPERTENSION: ICD-10-CM

## 2021-06-14 PROCEDURE — 70551 MRI BRAIN STEM W/O DYE: CPT

## 2021-06-15 NOTE — PROGRESS NOTES
Shine Davis, can you tell patient that MRI looks the same. How is she doing? Are headaches better? Does she have an eye appointment? If headaches continue then we will send her to Dr. Dom Jameson (neurosurgery) for consideration of shunt.

## 2021-06-28 ENCOUNTER — PATIENT OUTREACH (OUTPATIENT)
Dept: OTHER | Age: 36
End: 2021-06-28

## 2021-06-28 NOTE — PROGRESS NOTES
Care Manager contacted the patient for follow up outreach, no response, left a VM message with this CM's contact information, asking for return call.   Await follow up from patient;

## 2021-07-01 ENCOUNTER — PATIENT OUTREACH (OUTPATIENT)
Dept: OTHER | Age: 36
End: 2021-07-01

## 2021-07-16 ENCOUNTER — PATIENT OUTREACH (OUTPATIENT)
Dept: OTHER | Age: 36
End: 2021-07-16

## 2021-07-16 NOTE — PROGRESS NOTES
Care Manager contacted the patient for follow up outreach, no response, left a VM message with this CM's contact information, asking for return call. Await follow up from patient when she has some free time between working and going to school.

## 2021-08-17 ENCOUNTER — HOSPITAL ENCOUNTER (OUTPATIENT)
Dept: LAB | Age: 36
Discharge: HOME | End: 2021-08-17
Payer: COMMERCIAL

## 2021-08-17 DIAGNOSIS — G93.2 IIH (IDIOPATHIC INTRACRANIAL HYPERTENSION): ICD-10-CM

## 2021-08-17 LAB — 25(OH)D3 SERPL-MCNC: 30.4 NG/ML (ref 30–100)

## 2021-08-17 PROCEDURE — 82306 VITAMIN D 25 HYDROXY: CPT

## 2021-08-17 PROCEDURE — 36415 COLL VENOUS BLD VENIPUNCTURE: CPT

## 2021-08-24 ENCOUNTER — PATIENT OUTREACH (OUTPATIENT)
Dept: OTHER | Age: 36
End: 2021-08-24

## 2021-08-24 NOTE — PROGRESS NOTES
Incoming call received, verified  and zip code with patient as identifiers. 30 yo female with idiopathic intracranial hypertension, daily headaches, bilateral papilledema; Works nights, classes during day, cares for her son, limited sleep;     ER Visit 21 worsening migraine headaches unrelieved by current pain regimen;   Labs:  Protein 8. 5(H);  Globulin 4. 5(H);    ER Visit 21 sent by Ophthalmologist - new Optic Nerve edema, Blurry vision, persistent global headache pain, feeling of pins and needles in feet, weakness, fatigue;     Inpatient Readmission Risk score: No data recorded    Assessment of clinical changes and knowledge demonstrated since last call:   Ongoing Plan of Care:     Goal:  Verbalizes decreased # Migraine HA Days while correctly taking treatment regimen;  · Neurology Dr Adeola Herring 21  · Continues to have daily Headaches, but has some improvement  · \"I have good days and bad days\"  · Increased Methazolamide dosage this visit  · Continue Aimovig - appears to be helping  · No new syncopal episodes  · \"pulling me down, like gravity\"    Goal:  Demonstrate behaviors that enable safe performance of daily acitivites  · Dizziness and blacking out, zoning out, at time when driving  · No episodes of syncope or zoning out under new treatment regimen;    Goal:  Completes all FU appointments as ordered   · Dr. John Hill Ophthalmology 21  · Papilledema both eyes, for FU visual fields every 3 months  · Check Disc OCT every three months  · Dr John Hill had hoped for LP   · CM requested office note for this be faxed to Neuro office [phone];  · Dr. Cullen Segovia of [place] - retired - OV 2021  · minimal nerve swelling at this time, not at significant risk of vision loss   · Right Left Disc Pseudopapilledema, lightly elevated superiorly, pigment at 10:30 at edege of disc- not definitley hemorrhage, question of superior temporal folds at edge of disc, Tiny hemorrhage at 11:30 off the edge of disc, possible trace superior disc edema, question of superior temporal folds at edge of disc; Also called pseudotumor cerebri;  · Treatment is a combination of therapies per Nolberto Rene:    · weight loss is critical - most benefit greatly from weight loss  · Pharmacologic therapy like diamox - not tolerated, Topamax, then Neptazane;  · MRI of the brain, a venogram study of the brain (either CT venography or MR venography), and a lumbar puncture - opening pressure on the LP is a critical measurement   · I do not think LP needed at this time, Will reassess in 3-4 months to see if she needs to have an LP     Goal:  Demonstrates no signs of complications or red flags in 30 days;  · Reviewed some steps in ICP action plan for information and red flags  · Reviewed monitoring plan for action when patient/family notes these changes to call Neurology immediately or call 911:  ? Monitor for signs of increased ICP / Red Flags  ? Worsening headache pain uncontrolled by medication regimen  ? Nausea and or vomiting  ? Increased BP  ? Decreased mental abilities, drowsiness, restlessness  ? Confusion about time, and then to location and people as the pressure worsens  ? Double Vision, blurry vision  ? Changes in breathing    Review and discussion of plan of care with patient, who has provided input to plan, verbalized understanding and agrees with current goals. Any recurrence Red Flags or continued symptoms: Continued HAs    Medication Regimen Change:  Increased dosage of Neptazane  Completed a review of medications with patient, who verbalized understanding of how and when to take medications.       Barriers / Adherence with medications:  none identified    Upcoming Appointments:    Future Appointments   Date Time Provider Trinidad Serra   9/7/2021  8:00 AM IMD NURSE ONLY [facility] IMD IMD   9/14/2021  2:00 PM Manuel Alvarez NP [facility] IMD IMD   11/29/2021  8:00 AM Jacinta Cohen DO BSND BSND Patient asked questions appropriately and denied any additional needs at this time. Patient verbalized understanding of all information discussed. Patient has my name and contact information for any follow up needs or questions.      Plan next call:    FU in one month on need for LP;

## 2021-10-17 PROBLEM — R51.9 CHRONIC INTRACTABLE HEADACHE: Status: ACTIVE | Noted: 2021-10-17

## 2021-10-17 PROBLEM — G93.2 IDIOPATHIC INTRACRANIAL HYPERTENSION: Status: ACTIVE | Noted: 2021-10-17

## 2021-10-17 PROBLEM — G89.29 CHRONIC INTRACTABLE HEADACHE: Status: ACTIVE | Noted: 2021-10-17

## 2021-11-02 ENCOUNTER — PATIENT OUTREACH (OUTPATIENT)
Dept: OTHER | Age: 36
End: 2021-11-02

## 2021-11-02 NOTE — PROGRESS NOTES
Care Manager contacted the patient for follow up outreach, no response, left a VM message with this CM's contact information, asking for return call. CM FU on effectiveness of current HA treatment plan and if need for LP or further diagnostics. Await follow up from patient.  If no new CM needs, may grad this episode of care;

## 2021-12-06 ENCOUNTER — PATIENT OUTREACH (OUTPATIENT)
Dept: OTHER | Age: 36
End: 2021-12-06

## 2021-12-06 NOTE — PROGRESS NOTES
Care Manager contacted the patient by telephone in follow up. Verified  and zip code with patient as identifiers. 30 yo female with idiopathic intracranial hypertension, daily headaches, bilateral papilledema; Works nights, classes during day, cares for her son, limited sleep;  Reports she has left [facility] [employer] due to the stress of her job; Thinks her insurance may end in December; Nothing on latest eligibility spreadsheet at this time;      Inpatient Readmission Risk score: No data recorded  Admission or ED Risk Score 41%    ER Visit 21 worsening migraine headaches unrelieved by current pain regimen;   Labs:  Protein 8. 5(H);  Globulin 4. 5(H);     ER Visit 21 sent by Ophthalmologist - new Optic Nerve edema, Blurry vision, persistent global headache pain, feeling of pins and needles in feet, weakness, fatigue;    Assessment of clinical changes and knowledge demonstrated since last call:   Ongoing Plan of Care:   Goal:  Completes all FU appointments as ordered   · Found a new NeuroOphthalmologist and completed initial appointment  · Continue to find disc edema, but continuing with watch and wait process for now     Goal:  Verbalizes decreased # Migraine HA Days while correctly taking treatment regimen;  · Neurology Dr Jeff Virk on 21  · Continues to have daily Headaches,   · Michel Hebert does not seem to have helped long term  · Given some Nurtec samples to try   · Methazolamide to 50mg TID daily;  · No new syncopal episodes or blacking out  · Quitting her job has helped with stress level for now;    Review and discussion of plan of care with patient, who has provided input to plan, verbalized understanding and agrees with current goals. Any recurrence Red Flags or continued symptoms: No red flags, but Has continue daily;     Medication Regimen Change:  Methazolamide 50mg to TID  Completed a review of medications with patient, who verbalized understanding of how and when to take medications. Barriers / Adherence with medications:  Plans to  her refills before insurance ends - hoping for 90 days;    Upcoming Appointments:    Future Appointments   Date Time Provider Trinidad Serra   4/4/2022  3:30 PM Darryle Drafts, DO [address]       Patient asked questions appropriately and denied any additional needs at this time. Patient verbalized understanding of all information discussed. Patient has my name and contact information for any follow up needs or questions. Plan next call:   Patient has made some progress with changes in medication regimen;  She will call when she knows her last day of insurance and let this CM know as well;

## 2022-01-08 ENCOUNTER — PATIENT OUTREACH (OUTPATIENT)
Dept: OTHER | Age: 37
End: 2022-01-08

## 2022-01-08 NOTE — PROGRESS NOTES
[employer] term date in December 2021, no [place] coverage as of 01/01/22;   Will resolve episode of care and patient agrees to reach out to this CM when new insurance is determined for CM referral;

## 2022-03-18 PROBLEM — D24.1 FIBROADENOMA OF BREAST, RIGHT: Status: ACTIVE | Noted: 2018-06-06

## 2022-03-18 PROBLEM — R51.9 CHRONIC INTRACTABLE HEADACHE: Status: ACTIVE | Noted: 2021-10-17

## 2022-03-18 PROBLEM — G89.29 CHRONIC INTRACTABLE HEADACHE: Status: ACTIVE | Noted: 2021-10-17

## 2022-03-18 PROBLEM — Z87.19 HX OF DIVERTICULITIS OF COLON: Status: ACTIVE | Noted: 2018-06-06

## 2022-03-19 PROBLEM — G47.00 INSOMNIA: Status: ACTIVE | Noted: 2018-06-06

## 2022-03-19 PROBLEM — R01.1 MURMUR, CARDIAC: Status: ACTIVE | Noted: 2018-06-06

## 2022-03-19 PROBLEM — F41.0 SEVERE ANXIETY WITH PANIC: Status: ACTIVE | Noted: 2018-06-06

## 2022-03-19 PROBLEM — I34.0 MITRAL VALVE REGURGITATION: Status: ACTIVE | Noted: 2018-05-21

## 2022-03-19 PROBLEM — G93.2 IDIOPATHIC INTRACRANIAL HYPERTENSION: Status: ACTIVE | Noted: 2021-10-17

## 2022-03-19 PROBLEM — M51.9 LUMBAR DISC DISORDER: Status: ACTIVE | Noted: 2018-06-06

## 2022-03-19 PROBLEM — R00.2 HEART PALPITATIONS: Status: ACTIVE | Noted: 2018-06-06

## 2022-03-19 PROBLEM — E55.9 VITAMIN D DEFICIENCY: Status: ACTIVE | Noted: 2018-06-06

## 2022-03-20 PROBLEM — R55 NEAR SYNCOPE: Status: ACTIVE | Noted: 2018-06-06

## 2022-03-20 PROBLEM — I34.1 MVP (MITRAL VALVE PROLAPSE): Status: ACTIVE | Noted: 2018-05-21

## 2022-03-20 PROBLEM — Z91.199 GENERAL PATIENT NONCOMPLIANCE: Status: ACTIVE | Noted: 2019-08-22

## 2022-03-20 PROBLEM — F32.A MILD DEPRESSION: Status: ACTIVE | Noted: 2018-06-06

## 2022-03-20 PROBLEM — B37.31 YEAST VAGINITIS: Status: ACTIVE | Noted: 2018-10-17

## 2022-06-14 ENCOUNTER — OFFICE VISIT (OUTPATIENT)
Dept: GYNECOLOGY | Age: 37
End: 2022-06-14

## 2022-06-14 VITALS
DIASTOLIC BLOOD PRESSURE: 80 MMHG | BODY MASS INDEX: 31.98 KG/M2 | WEIGHT: 211 LBS | SYSTOLIC BLOOD PRESSURE: 122 MMHG | HEIGHT: 68 IN

## 2022-06-14 DIAGNOSIS — Z12.4 SCREENING FOR MALIGNANT NEOPLASM OF CERVIX: ICD-10-CM

## 2022-06-14 DIAGNOSIS — A60.9 HSV (HERPES SIMPLEX VIRUS) ANOGENITAL INFECTION: ICD-10-CM

## 2022-06-14 DIAGNOSIS — Z11.3 SCREENING FOR STD (SEXUALLY TRANSMITTED DISEASE): Primary | ICD-10-CM

## 2022-06-14 DIAGNOSIS — Z31.69 ENCOUNTER FOR PRECONCEPTION CONSULTATION: ICD-10-CM

## 2022-06-14 PROCEDURE — 99214 OFFICE O/P EST MOD 30 MIN: CPT | Performed by: OBSTETRICS & GYNECOLOGY

## 2022-06-14 RX ORDER — VALACYCLOVIR HYDROCHLORIDE 1 G/1
1000 TABLET, FILM COATED ORAL DAILY
Qty: 90 TABLET | Refills: 1 | Status: SHIPPED | OUTPATIENT
Start: 2022-06-14

## 2022-06-14 NOTE — PROGRESS NOTES
Chief Complaint   Patient presents with    Other     Wants HIV testing only and Pap with GC Chlamydia    Other     Family Planning   This patient presents with several concerns today. First she wants HIV testing and GC and Chlamydia testing after exposure in May. 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 This caused a divorce with her  and she still gets an occasional outbreak. We discussed Valtrex and how to take it half a gram a day for prophylaxis 1 g if needed. She can also take the tablet break in half and use twice daily for a treatment course for 7 to 10 days. Most of today's visit was spent discussing the loss of her daughter due to diffuse intrinsic pontine glioma. I reviewed this online and there appears to be a possible genetic link. She needs to know all of her risk factors before she tries to get pregnant again. She is 40years of age. We did discuss the increased risk of other genetic problems as she ages. Presently the patient is in school does not have insurance. She finishes in August and when she gets insurance she will call back and let us arrange a genetics consult for her. No flowsheet data found. Vitals:    06/14/22 0840   BP: 122/80   Weight: 211 lb (95.7 kg)   Height: 5' 8\" (1.727 m)     Physical Examination:  Chest -   Heart -   Pelvic - normal external genitalia, vulva, vagina, cervix, uterus and adnexa    ROS is negative except as described in chief complaint.   Past Surgical History:   Procedure Laterality Date    BREAST BIOPSY Right 2013    excision fibroadenoma    BREAST SURGERY Right 2014    biopsy    COLONOSCOPY  2016    Had in [place], has seen Dr Erick Alvarez     Current Outpatient Medications   Medication Sig Dispense Refill    valACYclovir (VALTREX) 1 g tablet Take 1 tablet by mouth daily 90 tablet 1    acetaminophen (TYLENOL) 500 MG tablet Take 500 mg by mouth daily      Erenumab-aooe (AIMOVIG) 70 MG/ML spent with this patient most of it in counseling.

## 2022-06-20 LAB
C TRACH RRNA CVX QL NAA+PROBE: NEGATIVE
CYTOLOGIST CVX/VAG CYTO: NORMAL
CYTOLOGY CVX/VAG DOC THIN PREP: NORMAL
HPV REFLEX: NORMAL
Lab: NORMAL
Lab: NORMAL
N GONORRHOEA RRNA CVX QL NAA+PROBE: NEGATIVE
PATH REPORT.FINAL DX SPEC: NORMAL
STAT OF ADQ CVX/VAG CYTO-IMP: NORMAL

## 2022-06-30 ENCOUNTER — PATIENT MESSAGE (OUTPATIENT)
Dept: NEUROLOGY | Age: 37
End: 2022-06-30

## 2022-06-30 DIAGNOSIS — G43.109 CHRONIC MIGRAINE WITH AURA: ICD-10-CM

## 2022-06-30 DIAGNOSIS — G93.2 IDIOPATHIC INTRACRANIAL HYPERTENSION: Primary | ICD-10-CM

## 2022-07-06 DIAGNOSIS — Z11.3 SCREENING FOR STD (SEXUALLY TRANSMITTED DISEASE): ICD-10-CM

## 2022-07-06 LAB
HIV 1+2 AB+HIV1 P24 AG SERPL QL IA: NONREACTIVE
HIV 1/2 RESULT COMMENT: NORMAL

## 2022-07-07 PROBLEM — R10.30 LOWER ABDOMINAL PAIN: Status: ACTIVE | Noted: 2020-01-14

## 2022-07-07 PROBLEM — K58.1 IRRITABLE BOWEL SYNDROME WITH CONSTIPATION: Status: ACTIVE | Noted: 2020-01-14

## 2022-07-07 PROBLEM — H47.333 PSEUDOPAPILLEDEMA OF BOTH OPTIC DISCS: Status: ACTIVE | Noted: 2021-03-18

## 2022-07-07 PROBLEM — R14.0 ABDOMINAL BLOATING: Status: ACTIVE | Noted: 2020-01-15

## 2022-07-07 RX ORDER — ERENUMAB-AOOE 70 MG/ML
70 INJECTION SUBCUTANEOUS
Qty: 1 ML | Refills: 11 | Status: SHIPPED | OUTPATIENT
Start: 2022-07-07 | End: 2022-10-04 | Stop reason: SDUPTHER

## 2022-07-07 RX ORDER — METHAZOLAMIDE 50 MG/1
100 TABLET ORAL 3 TIMES DAILY
Qty: 180 TABLET | Refills: 11 | Status: SHIPPED | OUTPATIENT
Start: 2022-07-07

## 2022-07-13 ENCOUNTER — TELEPHONE (OUTPATIENT)
Dept: NEUROLOGY | Age: 37
End: 2022-07-13

## 2022-07-19 NOTE — TELEPHONE ENCOUNTER
Notice of insufficient Information is being requested . Information complete notes faxed back [phone].    Confirmation received

## 2022-07-19 NOTE — TELEPHONE ENCOUNTER
Notice of insufficient Information is being requested . Information complete notes faxed back [phone].    Confirmation received

## 2022-07-19 NOTE — TELEPHONE ENCOUNTER
More forms faxed to office to fill out from [facility] and to be faxed back to [phone]

## 2022-08-05 ENCOUNTER — APPOINTMENT (OUTPATIENT)
Dept: CT IMAGING | Age: 37
End: 2022-08-05
Payer: COMMERCIAL

## 2022-08-05 ENCOUNTER — HOSPITAL ENCOUNTER (EMERGENCY)
Age: 37
Discharge: HOME | End: 2022-08-05
Attending: EMERGENCY MEDICINE
Payer: COMMERCIAL

## 2022-08-05 VITALS
BODY MASS INDEX: 32.28 KG/M2 | HEART RATE: 76 BPM | TEMPERATURE: 98 F | OXYGEN SATURATION: 100 % | SYSTOLIC BLOOD PRESSURE: 123 MMHG | HEIGHT: 68 IN | RESPIRATION RATE: 18 BRPM | DIASTOLIC BLOOD PRESSURE: 79 MMHG | WEIGHT: 213 LBS
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
## 2022-08-05 DIAGNOSIS — S16.1XXA ACUTE STRAIN OF NECK MUSCLE, INITIAL ENCOUNTER: ICD-10-CM

## 2022-08-05 PROCEDURE — 70450 CT HEAD/BRAIN W/O DYE: CPT

## 2022-08-05 PROCEDURE — 72125 CT NECK SPINE W/O DYE: CPT

## 2022-08-05 PROCEDURE — 99284 EMERGENCY DEPT VISIT MOD MDM: CPT

## 2022-08-05 RX ORDER — NAPROXEN 500 MG/1
500 TABLET ORAL 2 TIMES DAILY WITH MEALS
Qty: 10 TABLET | Refills: 0 | Status: SHIPPED | OUTPATIENT
Start: 2022-08-05 | End: 2022-08-16

## 2022-08-05 RX ORDER — CYCLOBENZAPRINE HCL 5 MG
5 TABLET ORAL 2 TIMES DAILY PRN
Qty: 10 TABLET | Refills: 0 | Status: SHIPPED | OUTPATIENT
Start: 2022-08-05 | End: 2022-08-12

## 2022-08-05 ASSESSMENT — PAIN SCALES - GENERAL: PAINLEVEL_OUTOF10: 5

## 2022-08-05 ASSESSMENT — ENCOUNTER SYMPTOMS
ABDOMINAL PAIN: 0
BACK PAIN: 0
SHORTNESS OF BREATH: 0

## 2022-08-05 ASSESSMENT — PAIN - FUNCTIONAL ASSESSMENT: PAIN_FUNCTIONAL_ASSESSMENT: 0-10
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
## 2022-08-05 NOTE — ED NOTES
I have reviewed discharge instructions with the patient. The patient verbalized understanding. Patient left ED via Discharge Method: ambulatory to Home with self    Opportunity for questions and clarification provided. Patient given 2 scripts. To continue your aftercare when you leave the hospital, you may receive an automated call from our care team to check in on how you are doing. This is a free service and part of our promise to provide the best care and service to meet your aftercare needs.  If you have questions, or wish to unsubscribe from this service please call [phone].   Thank you for Choosing our [facility] Emergency Department. [address] [facility]  08/05/22 7351

## 2022-08-16 ENCOUNTER — OFFICE VISIT (OUTPATIENT)
Dept: NEUROLOGY | Age: 37
End: 2022-08-16

## 2022-08-16 VITALS
WEIGHT: 219 LBS | BODY MASS INDEX: 33.19 KG/M2 | HEIGHT: 68 IN | SYSTOLIC BLOOD PRESSURE: 126 MMHG | HEART RATE: 102 BPM | OXYGEN SATURATION: 100 % | DIASTOLIC BLOOD PRESSURE: 75 MMHG

## 2022-08-16 DIAGNOSIS — G93.2 IDIOPATHIC INTRACRANIAL HYPERTENSION: Primary | ICD-10-CM

## 2022-08-16 DIAGNOSIS — G43.109 CHRONIC MIGRAINE WITH AURA: ICD-10-CM

## 2022-08-16 PROCEDURE — 99214 OFFICE O/P EST MOD 30 MIN: CPT | Performed by: PSYCHIATRY & NEUROLOGY
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
## 2022-10-03 ENCOUNTER — TELEPHONE (OUTPATIENT)
Dept: NEUROLOGY | Age: 37
End: 2022-10-03

## 2022-10-04 ENCOUNTER — PATIENT MESSAGE (OUTPATIENT)
Dept: NEUROLOGY | Age: 37
End: 2022-10-04

## 2022-10-04 DIAGNOSIS — G43.109 CHRONIC MIGRAINE WITH AURA: ICD-10-CM

## 2022-10-04 RX ORDER — ERENUMAB-AOOE 70 MG/ML
70 INJECTION SUBCUTANEOUS
Qty: 1 ML | Refills: 11 | Status: SHIPPED | OUTPATIENT
Start: 2022-10-04

## 2022-10-04 NOTE — TELEPHONE ENCOUNTER
From: Chloe Caldera  To: Dr. Koch Notice  Sent: 10/4/2022 8:34 AM EDT  Subject: Follow up     Good morning. Dr Xena Rai sir, I am experiencing a lot of pins and needles in the sole of my feet and fingers. It comes out of no where at time throughout the day and can last from a few mins to an hr. It feels like someone is pricking me with a thousand needles at the same time. Its happening more frequently now and I was trying to wait till my next appointment before I mentioned it but the frequency is troublesome to me. Vomiting has subsided however very much nauseated and having more vertigo spells with intense migraines. Also can I please get a Aimovig? Thank you .

## 2022-10-10 ENCOUNTER — TELEPHONE (OUTPATIENT)
Dept: NEUROLOGY | Age: 37
End: 2022-10-10

## 2022-10-10 NOTE — TELEPHONE ENCOUNTER
Patient called to check on status of the My Chart message sent to Dr Rolo Reyes. I explained to her that Dr Rolo Reyes would be back in the office tomorrow. She states that her issues are getting worse.     She will come to  a sample of Aimovig 140 mg, tomorrow AM around 10:30-11 AM

## 2022-10-11 NOTE — TELEPHONE ENCOUNTER
Good morning. Dr Xena Rai sir, I am  experiencing a lot of pins and needles in the sole of my feet  and fingers. It comes out of no where at time throughout the day and can last from a few mins to an hr. It feels like someone is pricking me with a thousand needles at the same time. Its happening more frequently now and I was trying to wait till my next  appointment before I mentioned it but the frequency is troublesome to me. Vomiting has subsided however very much nauseated and having more vertigo spells with intense migraines. Also can I please get a Aimovig? Thank you .

## 2022-11-07 ENCOUNTER — OFFICE VISIT (OUTPATIENT)
Dept: GYNECOLOGY | Age: 37
End: 2022-11-07
Payer: MEDICAID

## 2022-11-07 VITALS
SYSTOLIC BLOOD PRESSURE: 140 MMHG | HEIGHT: 68 IN | DIASTOLIC BLOOD PRESSURE: 100 MMHG | WEIGHT: 211 LBS | BODY MASS INDEX: 31.98 KG/M2

## 2022-11-07 DIAGNOSIS — Z71.1 CONCERN ABOUT STD IN FEMALE WITHOUT DIAGNOSIS: Primary | ICD-10-CM

## 2022-11-07 DIAGNOSIS — J02.9 SORE THROAT: ICD-10-CM

## 2022-11-07 PROCEDURE — 99214 OFFICE O/P EST MOD 30 MIN: CPT | Performed by: OBSTETRICS & GYNECOLOGY

## 2022-11-07 RX ORDER — PANTOPRAZOLE SODIUM 40 MG/1
40 TABLET, DELAYED RELEASE ORAL DAILY
COMMUNITY
Start: 2022-09-19 | End: 2023-01-17

## 2022-11-07 NOTE — PROGRESS NOTES
ZANE Barksdale is a 40 y.o. female seen for possible exposure to an STD. She had oral sex with her boyfriend and vomited an odd color after he ejaculated in her mouth. She has not had vaginal intercourse. We had to go to the lab to find a unit structure for the throat she also wants to be checked for strep. On examination there appeared to be no exudates present in the pharynx. Past Medical History, Past Surgical History, Family history, Social History, and Medications were all reviewed with the patient today and updated as necessary. Current Outpatient Medications   Medication Sig    pantoprazole (PROTONIX) 40 MG tablet Take 40 mg by mouth daily    Erenumab-aooe (AIMOVIG) 70 MG/ML SOAJ Inject 70 mg into the skin every 30 days    naproxen (NAPROSYN) 500 MG tablet Take 1 tablet by mouth in the morning and 1 tablet in the evening. Take with meals. Do all this for 5 days. methazolAMIDE (NEPTAZANE) 50 MG tablet Take 2 tablets by mouth 3 times daily    valACYclovir (VALTREX) 1 g tablet Take 1 tablet by mouth daily    acetaminophen (TYLENOL) 500 MG tablet Take 500 mg by mouth daily     No current facility-administered medications for this visit.      No Known Allergies  Past Medical History:   Diagnosis Date    Anxiety     Depression     Diverticulitis 2011 2016    Diverticulosis 2011    Fibroadenoma of breast, right     Insomnia     Lumbar disc disorder     Mitral valve regurgitation 05/21/2018    1+    MVP (mitral valve prolapse) 05/21/2018    mild anterior leaflet MVP    Syncope     1st occur 2-3 months ag    Vitamin D deficiency 05/29/2018     Past Surgical History:   Procedure Laterality Date    BREAST BIOPSY Right 2013    excision fibroadenoma    BREAST SURGERY Right 2014    biopsy    COLONOSCOPY  2016    Had in [place], has seen Dr Nazario Mayer     Family History   Problem Relation Age of Onset    Hypertension Father     No Known Problems Mother     Breast Cancer Paternal Aunt     Cancer Paternal Aunt breast cancer      Social History     Tobacco Use    Smoking status: Never    Smokeless tobacco: Never   Substance Use Topics    Alcohol use: No       Social History     Substance and Sexual Activity   Sexual Activity Yes    Partners: Male     OB History    Para Term  AB Living   4 2 0 0 2 0   SAB IAB Ectopic Molar Multiple Live Births   0 0 0 0 0 0      # Outcome Date GA Lbr Tres/2nd Weight Sex Delivery Anes PTL Lv   4 AB            3 AB            2 Para            1 Para               Obstetric Comments   Vaginal births       Health Maintenance    ROS:    Review of Systems  General: Not Present- Chills, Fever, Fatigue, Insomnia, Hot flashes/Night sweats, Weight gain  Skin: Not Present- Bruising, Change in Wart/Mole, Excessive Sweating, Itching, Nail Changes, New Lesions, Rash, Skin Color Changes and Ulcer. HEENT: Not Present- Headache, Blurred Vision, Double Vision, Glaucoma, Visual Disturbances, Hearing Loss, Ringing in the Ears, Vertigo, Nose Bleed, Bleeding Gums, Hoarseness and Sore Throat. Neck: Not Present- Neck Pain and Neck Swelling. Respiratory: Not Present- Cough, Difficulty Breathing and Difficulty Breathing on Exertion. Breast: Not Present- Breast Mass, Breast Pain, Breast Swelling, Nipple Discharge, Nipple Pain, Recent Breast Size Changes and Skin Changes. Cardiovascular: Not Present- Abnormal Blood Pressure, Chest Pain, Edema, Fainting / Blacking Out, Palpitations, Shortness of Breath and Swelling of Extremities. Gastrointestinal: Not Present- Abdominal Pain, Abdominal Swelling, Bloating, Change in Bowel Habits, Constipation, Diarrhea, Difficulty Swallowing, Gets full quickly at meals, Nausea, Rectal Bleeding and Vomiting.   Female Genitourinary: Not Present- Dysmenorrhea, Dyspareunia, Decreased libido, Excessive Menstrual Bleeding, Menstrual Irregularities, Pelvic Pain, Urinary Complaints, Vaginal Discharge, Vaginal itching/burning, Vaginal odor  Musculoskeletal: Not Present- Joint Pain and Muscle Pain. Neurological: Not Present- Dizziness, Fainting, Headaches and Seizures. Psychiatric: Not Present- Anxiety, Depression, Mood changes and Panic Attacks. Endocrine: Not Present- Appetite Changes, Cold Intolerance, Excessive Thirst, Excessive Urination and Heat Intolerance. Hematology: Not Present- Abnormal Bleeding, Easy Bruising and Enlarged Lymph Nodes. PHYSICAL EXAM:    BP (!) 140/100 (Position: Sitting)   Ht 5' 8\" (1.727 m)   Wt 211 lb (95.7 kg)   LMP 11/05/2022   BMI 32.08 kg/m²     Swab obtained in the pharynx      Medical problems and test results were reviewed with the patient today. ASSESSMENT and PLAN    1. Concern about STD in female without diagnosis  -     Hepatitis B Surface Antigen; Future  -     Hepatitis C Antibody; Future  -     HIV 1/2 Ag/Ab, 4TH Generation,W Rflx Confirm; Future  -     HSV 1 and 2 Specific Ab, IgG; Future  -     RPR; Future  -     C.trachomatis N.gonorrhoeae DNA  2. Sore throat  -     Strep A culture, throat     Patient will delay blood work for 1 week      Time:  I spent  30 minutes in preparing to see patient (including chart review and preparation), obtaining and/or reviewing additional medical history, performing a physical exam and evaluation, documenting clinical information in the electronic health record, independently interpreting results, communicating results to patient, family or caregiver, and/or coordinating care. No follow-ups on file.        Yolie Bishop MD

## 2022-11-10 LAB
BACTERIA SPEC CULT: NORMAL
C TRACH RRNA SPEC QL NAA+PROBE: NEGATIVE
N GONORRHOEA RRNA SPEC QL NAA+PROBE: NEGATIVE
SERVICE CMNT-IMP: NORMAL
SPECIMEN SOURCE: NORMAL

## 2022-11-14 DIAGNOSIS — Z71.1 CONCERN ABOUT STD IN FEMALE WITHOUT DIAGNOSIS: ICD-10-CM

## 2022-11-14 LAB
HBV SURFACE AG SER QL: NONREACTIVE
HCV AB SER QL: NONREACTIVE
HIV 1+2 AB+HIV1 P24 AG SERPL QL IA: NONREACTIVE
HIV 1/2 RESULT COMMENT: NORMAL
RPR SER QL: NONREACTIVE

## 2022-11-15 LAB
HSV1 IGG SER IA-ACNC: >62.2 INDEX (ref 0–0.9)
HSV2 IGG SER IA-ACNC: <0.91 INDEX (ref 0–0.9)

## 2022-11-16 ENCOUNTER — TELEPHONE (OUTPATIENT)
Dept: NEUROLOGY | Age: 37
End: 2022-11-16

## 2022-11-16 NOTE — TELEPHONE ENCOUNTER
Patient called needing samples of Wily Patricio will also check on her prior Auth .  Sample in fridge awaiting for

## 2022-11-28 ENCOUNTER — OFFICE VISIT (OUTPATIENT)
Dept: NEUROLOGY | Age: 37
End: 2022-11-28
Payer: MEDICAID

## 2022-11-28 VITALS
OXYGEN SATURATION: 98 % | HEIGHT: 68 IN | HEART RATE: 77 BPM | WEIGHT: 211.6 LBS | BODY MASS INDEX: 32.07 KG/M2 | SYSTOLIC BLOOD PRESSURE: 122 MMHG | DIASTOLIC BLOOD PRESSURE: 79 MMHG

## 2022-11-28 DIAGNOSIS — G43.109 CHRONIC MIGRAINE WITH AURA: ICD-10-CM

## 2022-11-28 DIAGNOSIS — G93.2 IDIOPATHIC INTRACRANIAL HYPERTENSION: Primary | ICD-10-CM

## 2022-11-28 PROCEDURE — 99214 OFFICE O/P EST MOD 30 MIN: CPT | Performed by: NURSE PRACTITIONER

## 2022-11-28 RX ORDER — CYCLOBENZAPRINE HCL 10 MG
TABLET ORAL
COMMUNITY
Start: 2022-11-14

## 2022-11-28 RX ORDER — DOCUSATE SODIUM -SENNOSIDES 50; 8.6 MG/1; MG/1
TABLET, COATED ORAL
COMMUNITY
Start: 2022-11-14

## 2022-11-28 RX ORDER — ERENUMAB-AOOE 70 MG/ML
70 INJECTION SUBCUTANEOUS
Qty: 1 ML | Refills: 11 | Status: SHIPPED | OUTPATIENT
Start: 2022-11-28

## 2022-11-28 RX ORDER — METHAZOLAMIDE 50 MG/1
100 TABLET ORAL 2 TIMES DAILY
Qty: 120 TABLET | Refills: 11 | Status: SHIPPED | OUTPATIENT
Start: 2022-11-28
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
## 2022-11-29 NOTE — TELEPHONE ENCOUNTER
PA STARTED (Key: [id number])      Aimovig 70MG/ML auto-injectors     Form  [facility] General Form   Plan Contact  [phone] phone  [phone] fax

## 2022-11-29 NOTE — TELEPHONE ENCOUNTER
PA STARTED Methazoiamide     Key: [id number]    Kaya Mcgraw of [facility] General Form  Prior Authorization Request Form for Prescription Medications for [facility] Members  [phone]  [phone]

## 2022-12-02 ENCOUNTER — TELEPHONE (OUTPATIENT)
Dept: NEUROLOGY | Age: 37
End: 2022-12-02

## 2022-12-06 ENCOUNTER — TELEPHONE (OUTPATIENT)
Dept: NEUROLOGY | Age: 37
End: 2022-12-06

## 2022-12-07 ENCOUNTER — TELEPHONE (OUTPATIENT)
Dept: NEUROLOGY | Age: 37
End: 2022-12-07

## 2022-12-07 ENCOUNTER — HOSPITAL ENCOUNTER (OUTPATIENT)
Dept: INTERVENTIONAL RADIOLOGY/VASCULAR | Age: 37
Discharge: HOME | End: 2022-12-10
Payer: COMMERCIAL

## 2022-12-07 VITALS
DIASTOLIC BLOOD PRESSURE: 61 MMHG | BODY MASS INDEX: 31.98 KG/M2 | SYSTOLIC BLOOD PRESSURE: 107 MMHG | OXYGEN SATURATION: 100 % | RESPIRATION RATE: 16 BRPM | HEIGHT: 68 IN | TEMPERATURE: 98.2 F | WEIGHT: 211 LBS | HEART RATE: 75 BPM

## 2022-12-07 DIAGNOSIS — G93.2 IDIOPATHIC INTRACRANIAL HYPERTENSION: ICD-10-CM

## 2022-12-07 LAB
APPEARANCE FLD: CLEAR
COLOR FLD: COLORLESS
GLUCOSE CSF-MCNC: 49 MG/DL (ref 40–70)
NUC CELL # FLD: 3 /CU MM
PROT CSF-MCNC: 28 MG/DL (ref 15–45)
RBC # FLD: 0 /CU MM
SPECIMEN SOURCE FLD: NORMAL
TUBE # CSF: NORMAL
TUBE # CSF: NORMAL

## 2022-12-07 PROCEDURE — 84157 ASSAY OF PROTEIN OTHER: CPT

## 2022-12-07 PROCEDURE — 82945 GLUCOSE OTHER FLUID: CPT

## 2022-12-07 PROCEDURE — 62328 DX LMBR SPI PNXR W/FLUOR/CT: CPT

## 2022-12-07 PROCEDURE — 89050 BODY FLUID CELL COUNT: CPT

## 2022-12-07 PROCEDURE — 2500000003 HC RX 250 WO HCPCS: Performed by: PHYSICIAN ASSISTANT

## 2022-12-07 RX ORDER — LIDOCAINE HYDROCHLORIDE 20 MG/ML
INJECTION, SOLUTION INFILTRATION; PERINEURAL
Status: COMPLETED | OUTPATIENT
Start: 2022-12-07 | End: 2022-12-07

## 2022-12-07 RX ADMIN — LIDOCAINE HYDROCHLORIDE 80 MG: 20 INJECTION, SOLUTION INFILTRATION; PERINEURAL at 12:21

## 2022-12-07 ASSESSMENT — PAIN - FUNCTIONAL ASSESSMENT: PAIN_FUNCTIONAL_ASSESSMENT: 0-10

## 2022-12-07 NOTE — DISCHARGE INSTRUCTIONS
If you have any questions about your procedure, please call the Interventional Radiology department at [phone]. After business hours (5pm) and weekends, call the answering service at [phone] and ask for the Radiologist on call to be paged. Si tiene Preguntas acerca del procedimiento, por favor llame al departamento de Radiología Intervencional al [phone]. Después de horas de oficina (5 pm) y los fines de [place], llamar al Lonia Jessica Javier al [phone] y pregunte por el Radiologo de [place] Hav[place]and Jordanhinoe. Interventional Radiology General Nurse Discharge    After general anesthesia or intravenous sedation, for 24 hours or while taking prescription Narcotics:  Limit your activities  Do not drive and operate hazardous machinery  Do not make important personal or business decisions  Do  not drink alcoholic beverages  If you have not urinated within 8 hours after discharge, please contact your surgeon on call. * Please give a list of your current medications to your Primary Care Provider. * Please update this list whenever your medications are discontinued, doses are     changed, or new medications (including over-the-counter products) are added. * Please carry medication information at all times in case of emergency situations. These are general instructions for a healthy lifestyle:    No smoking/ No tobacco products/ Avoid exposure to second hand smoke  Surgeon General's Warning:  Quitting smoking now greatly reduces serious risk to your health.     Obesity, smoking, and sedentary lifestyle greatly increases your risk for illness  A healthy diet, regular physical exercise & weight monitoring are important for maintaining a healthy lifestyle    You may be retaining fluid if you have a history of heart failure or if you experience any of the following symptoms:  Weight gain of 3 pounds or more overnight or 5 pounds in a week, increased swelling in our hands or feet or shortness of breath while lying flat in bed. Please call your doctor as soon as you notice any of these symptoms; do not wait until your next office visit. Recognize signs and symptoms of STROKE:  F-face looks uneven    A-arms unable to move or move unevenly    S-speech slurred or non-existent    T-time-call 911 as soon as signs and symptoms begin-DO NOT go       Back to bed or wait to see if you get better-TIME IS BRAIN.

## 2022-12-09 DIAGNOSIS — G93.2 IDIOPATHIC INTRACRANIAL HYPERTENSION: Primary | ICD-10-CM

## 2022-12-09 NOTE — PROGRESS NOTES
Order placed for surgical weight loss program at [facility] with consideration of bariatric surgery for refractory idiopathic intracranial hypertension (pseudotumor cerebri)    I called the patient and left a message for her to call back to the office.

## 2022-12-12 ENCOUNTER — TELEPHONE (OUTPATIENT)
Dept: NEUROLOGY | Age: 37
End: 2022-12-12

## 2022-12-12 NOTE — TELEPHONE ENCOUNTER
Informed patient that if it has been over 2 days she needs to reach out to IR about her headaches. Patient stated that she spoke with Dr. Jose Ortiz on Friday and she will reach out to IR today.

## 2022-12-12 NOTE — TELEPHONE ENCOUNTER
----- [facility] from [facility] Meghna Martell sent at 12/12/2022  8:23 AM EST -----  Regarding: Bad  headaches   Also I have an upcoming dentist appointment for a root canal and fillings. Is it safe to do that ?

## 2022-12-13 ENCOUNTER — OFFICE VISIT (OUTPATIENT)
Dept: GYNECOLOGY | Age: 37
End: 2022-12-13
Payer: COMMERCIAL

## 2022-12-13 VITALS
SYSTOLIC BLOOD PRESSURE: 112 MMHG | DIASTOLIC BLOOD PRESSURE: 68 MMHG | HEIGHT: 68 IN | WEIGHT: 202.8 LBS | BODY MASS INDEX: 30.74 KG/M2

## 2022-12-13 DIAGNOSIS — Z20.2 STD EXPOSURE: ICD-10-CM

## 2022-12-13 DIAGNOSIS — Z20.2 STD EXPOSURE: Primary | ICD-10-CM

## 2022-12-13 LAB
HBV SURFACE AG SER QL: NONREACTIVE
HCV AB SER QL: NONREACTIVE
HIV 1+2 AB+HIV1 P24 AG SERPL QL IA: NONREACTIVE
HIV 1/2 RESULT COMMENT: NORMAL
WET PREP (POC): NEGATIVE

## 2022-12-13 PROCEDURE — 87210 SMEAR WET MOUNT SALINE/INK: CPT | Performed by: OBSTETRICS & GYNECOLOGY

## 2022-12-13 PROCEDURE — 99214 OFFICE O/P EST MOD 30 MIN: CPT | Performed by: OBSTETRICS & GYNECOLOGY

## 2022-12-13 RX ORDER — LUBIPROSTONE 24 UG/1
24 CAPSULE, GELATIN COATED ORAL 2 TIMES DAILY
COMMUNITY
Start: 2022-11-29 | End: 2023-02-27

## 2022-12-13 NOTE — PROGRESS NOTES
HPI  Ramon Arroyo is a 40 y.o. female seen for STD testing. STD exposure and wants full panel. Also wants checked for yeast.  Has a discharge, no odor, but not normal for her. States she has Herpes 2 but don't understand why results were negative. I explained her that that the IgG was positive HSV 1 and that she probably has genital HSV 1. The wet prep was unremarkable and new swab testing and blood work will be done. Past Medical History, Past Surgical History, Family history, Social History, and Medications were all reviewed with the patient today and updated as necessary. Current Outpatient Medications   Medication Sig    lubiprostone (AMITIZA) 24 MCG capsule Take 24 mcg by mouth 2 times daily    cyclobenzaprine (FLEXERIL) 10 MG tablet TAKE 1/2-1 TABLET BY MOUTH EVERY 8 HOURS AS NEEDED FOR MUSCLE ACHES AND SPASMS    SENEXON-S 8.6-50 MG per tablet TAKE 1 TABLET BY MOUTH 2 (TWO) TIMES A DAY STOP SENNA    Erenumab-aooe (AIMOVIG) 70 MG/ML SOAJ Inject 70 mg into the skin every 30 days    methazolAMIDE (NEPTAZANE) 50 MG tablet Take 2 tablets by mouth 2 times daily    pantoprazole (PROTONIX) 40 MG tablet Take 40 mg by mouth daily    acetaminophen (TYLENOL) 500 MG tablet Take 500 mg by mouth daily    naproxen (NAPROSYN) 500 MG tablet Take 1 tablet by mouth in the morning and 1 tablet in the evening. Take with meals. Do all this for 5 days. No current facility-administered medications for this visit.      No Known Allergies  Past Medical History:   Diagnosis Date    Anxiety     Depression     Diverticulitis 2011 2016    Diverticulosis 2011    Fibroadenoma of breast, right     Insomnia     Lumbar disc disorder     Mitral valve regurgitation 05/21/2018    1+    MVP (mitral valve prolapse) 05/21/2018    mild anterior leaflet MVP    Syncope     1st occur 2-3 months ag    Vitamin D deficiency 05/29/2018     Past Surgical History:   Procedure Laterality Date    BREAST BIOPSY Right 2013    excision fibroadenoma    BREAST SURGERY Right 2014    biopsy    COLONOSCOPY  2016    Had in [place], has seen Dr Jorge Luis Ramirez  2022     Family History   Problem Relation Age of Onset    Hypertension Father     No Known Problems Mother     Breast Cancer Paternal Aunt     Cancer Paternal Aunt         breast cancer      Social History     Tobacco Use    Smoking status: Never    Smokeless tobacco: Never   Substance Use Topics    Alcohol use: No       Social History     Substance and Sexual Activity   Sexual Activity Not Currently    Partners: Male     OB History    Para Term  AB Living   4 2 0 0 2 0   SAB IAB Ectopic Molar Multiple Live Births   0 0 0 0 0 0      # Outcome Date GA Lbr Tres/2nd Weight Sex Delivery Anes PTL Lv   4 AB            3 AB            2 Para            1 Para               Obstetric Comments   Vaginal births       Health Maintenance  Mammogram:   Colonoscopy: 2016  Bone Density:  Pap:  22    ROS:    Review of Systems  General: Not Present- Chills, Fever, Fatigue, Insomnia, Hot flashes/Night sweats, Weight gain  Skin: Not Present- Bruising, Change in Wart/Mole, Excessive Sweating, Itching, Nail Changes, New Lesions, Rash, Skin Color Changes and Ulcer. HEENT: Not Present- Headache, Blurred Vision, Double Vision, Glaucoma, Visual Disturbances, Hearing Loss, Ringing in the Ears, Vertigo, Nose Bleed, Bleeding Gums, Hoarseness and Sore Throat. Neck: Not Present- Neck Pain and Neck Swelling. Respiratory: Not Present- Cough, Difficulty Breathing and Difficulty Breathing on Exertion. Breast: Not Present- Breast Mass, Breast Pain, Breast Swelling, Nipple Discharge, Nipple Pain, Recent Breast Size Changes and Skin Changes. Cardiovascular: Not Present- Abnormal Blood Pressure, Chest Pain, Edema, Fainting / Blacking Out, Palpitations, Shortness of Breath and Swelling of Extremities.   Gastrointestinal: Not Present- Abdominal Pain, Abdominal Swelling, Bloating, Change in Bowel Habits, Constipation, Diarrhea, Difficulty Swallowing, Gets full quickly at meals, Nausea, Rectal Bleeding and Vomiting. Female Genitourinary: Not Present- Dysmenorrhea, Dyspareunia, Decreased libido, Excessive Menstrual Bleeding, Menstrual Irregularities, Pelvic Pain, Urinary Complaints, Vaginal Discharge, Vaginal itching/burning, Vaginal odor  Musculoskeletal: Not Present- Joint Pain and Muscle Pain. Neurological: Not Present- Dizziness, Fainting, Headaches and Seizures. Psychiatric: Not Present- Anxiety, Depression, Mood changes and Panic Attacks. Endocrine: Not Present- Appetite Changes, Cold Intolerance, Excessive Thirst, Excessive Urination and Heat Intolerance. Hematology: Not Present- Abnormal Bleeding, Easy Bruising and Enlarged Lymph Nodes. PHYSICAL EXAM:    /68 (Site: Left Upper Arm, Position: Sitting)   Ht 5' 8\" (1.727 m)   Wt 202 lb 12.8 oz (92 kg)   LMP 12/01/2022 (Exact Date)   BMI 30.84 kg/m²           Medical problems and test results were reviewed with the patient today. ASSESSMENT and PLAN    1. STD exposure  -     Hepatitis B Surface Antigen; Future  -     Hepatitis C Antibody; Future  -     HIV 1/2 Ag/Ab, 4TH Generation,W Rflx Confirm; Future  -     HSV 1 and 2 Specific Ab, IgG; Future  -     RPR; Future  -     Nuswab Vaginitis Plus (VG+)  -     Smear, Wet Mount, Saline ([id number])           Time:  I spent  30 minutes in preparing to see patient (including chart review and preparation), obtaining and/or reviewing additional medical history, performing a physical exam and evaluation, documenting clinical information in the electronic health record, independently interpreting results, communicating results to patient, family or caregiver, and/or coordinating care. No follow-ups on file.        Arthur Severino MD

## 2022-12-14 LAB — RPR SER QL: NONREACTIVE

## 2022-12-15 LAB
A VAGINAE DNA VAG QL NAA+PROBE: NORMAL SCORE
BVAB2 DNA VAG QL NAA+PROBE: NORMAL SCORE
C ALBICANS DNA VAG QL NAA+PROBE: NEGATIVE
C GLABRATA DNA VAG QL NAA+PROBE: NEGATIVE
C TRACH RRNA SPEC QL NAA+PROBE: NEGATIVE
HSV1 IGG SER IA-ACNC: 35.8 INDEX (ref 0–0.9)
HSV2 IGG SER IA-ACNC: 9.14 INDEX (ref 0–0.9)
MEGA1 DNA VAG QL NAA+PROBE: NORMAL SCORE
N GONORRHOEA RRNA SPEC QL NAA+PROBE: NEGATIVE
SPECIMEN SOURCE: NORMAL
T VAGINALIS RRNA SPEC QL NAA+PROBE: NEGATIVE

## 2022-12-21 ENCOUNTER — TELEPHONE (OUTPATIENT)
Dept: NEUROLOGY | Age: 37
End: 2022-12-21

## 2022-12-21 DIAGNOSIS — G93.2 IDIOPATHIC INTRACRANIAL HYPERTENSION: Primary | ICD-10-CM

## 2022-12-22 RX ORDER — ACETAZOLAMIDE 500 MG/1
500 CAPSULE, EXTENDED RELEASE ORAL 2 TIMES DAILY
Qty: 60 CAPSULE | Refills: 3 | Status: SHIPPED | OUTPATIENT
Start: 2022-12-22

## 2022-12-27 ENCOUNTER — TELEPHONE (OUTPATIENT)
Dept: GYNECOLOGY | Age: 37
End: 2022-12-27

## 2022-12-27 DIAGNOSIS — A60.9 HSV (HERPES SIMPLEX VIRUS) ANOGENITAL INFECTION: Primary | ICD-10-CM

## 2022-12-27 NOTE — TELEPHONE ENCOUNTER
Patient notified of lab results and she has a history of HSV. She would like refill of the Valtrex to take one daily as prevention. Notified Dr Valeriy Scott will be back in the office on Thursday to sign off on the medication.

## 2022-12-29 RX ORDER — VALACYCLOVIR HYDROCHLORIDE 500 MG/1
500 TABLET, FILM COATED ORAL DAILY
Qty: 90 TABLET | Refills: 4 | Status: SHIPPED | OUTPATIENT
Start: 2022-12-29

## 2023-01-09 ENCOUNTER — HOSPITAL ENCOUNTER (OUTPATIENT)
Dept: PHYSICAL THERAPY | Age: 38
Setting detail: RECURRING SERIES
Discharge: HOME | End: 2023-01-12
Payer: MEDICAID

## 2023-01-09 PROCEDURE — 97162 PT EVAL MOD COMPLEX 30 MIN: CPT

## 2023-01-09 PROCEDURE — 97530 THERAPEUTIC ACTIVITIES: CPT

## 2023-01-09 ASSESSMENT — PAIN SCALES - GENERAL: PAINLEVEL_OUTOF10: 2

## 2023-01-09 NOTE — THERAPY EVALUATION
Xiomara Simeon  : 1985  Primary:  (No info available)  Secondary:  [facility] [address] [address]  Phone: [phone]  Fax: [phone] Plan Frequency: 1x/week       PT Visit Info:    Plan Frequency: 1x/week    Visit Count:  1                OUTPATIENT PHYSICAL THERAPY:             OP NOTE TYPE: Initial Assessment 2023               Episode  Appt Desk         Treatment Diagnosis:  Lack of coordination (muscle incoordination) (R27.8)  Constipation, unspecified (K59.00)  Muscle spasm (M62.83)  Medical/Referring Diagnosis:  Segmental and somatic dysfunction of pelvic region [M99.05]  Referring Physician:  Khari Archuleta DO MD Orders:  PT Eval and Treat   Return MD Appt:    Date of Onset:       Allergies:  Patient has no known allergies. Restrictions/Precautions:           Medications Last Reviewed:  2023     SUBJECTIVE   History of Injury/Illness (Reason for Referral):  Ms. Elina Lemus is a 39 yo female referred to pelvic floor physical therapy (PFPT) by Khari Archuleta DO 2/2 Segmental and somatic dysfunction of pelvic region [M99.05]. Pt with chronic constipation. Pt reports she was hospitalized in 2016 for Diverticulitis. Following this episode she has noticed constipation has worsened. Pt is experiencing bloating and nausea. Pt has been advised to avoid straining. She is currently waiting for a strong urge to defecate. She feels stool present and is unable to push it out. She will sit on the toilet for up to 60 minutes. Pain is rated 10/10 with inability to evacuate and straining. She does reports self digitally evacuating. She has episodes of rectal bleeding. She has passed out on the commode due to straining. She has tried medications to improve stool consistency. She is currently taking Lubiprostone and Senexon. She also experiences GERD. She has tried Miralax, metamucil, and Benefiber.  States these improved her stool consistency but caused her more abdominal pain/discomfort. She has had 4 colonoscopies. Has not had anal rectal manometry. Medical history:   - Idiopathic intracranial pressure. Followed by neurology. Taking Acetazolamide.   - LBP    Diet/exercise - Recently started exercising, limiting salt and sugar    Urinary: Frequency >10 x/day, 5x/night. She works nights. Negative for stress urinary incontinence, urge urinary incontinence, urinary urgency, urinary frequency, incomplete emptying, urinary hesitancy/intermittency, dysuria, hematuria, nocturia, and nocturnal enuresis. Pt does not use pads for urinary protection; 0 pads per day (PPD). Fluid intake: 1/2 gallon water/day; bladder irritants include: Smoothies - 32 oz Pt does not limit fluid intake due to bladder control. Bowel: Frequency 1x/week if bad week, 4x/week in good week. Positive for pain with bowel movement, pushing/straining with bowel movement, constipation, and incomplete emptying. Negative for fecal incontinence. Pt does not use pads for bowel protection; 0 pads per day (PPD). Keyes stool type: 1, 2 (bad days)  4 (good day). Use of stool softeners or laxatives? Yes (see above)    Sexual: Pt is not sexually active with male partners. States she has not had any desire to have sexual intercourse. Denies pain with previous pelvic examination. Pelvic Organ Prolapse/Pelvic Pain: Heaviness present. OB History: Number of pregnancies: 4 Number of vaginal deliveries: 2        Patient Stated Goal(s):  \"Proper technique to have BM\"  Initial:     2/10 Post Session:      /10  Past Medical History/Comorbidities:   Ms. Lizeth Delgado  has a past medical history of Anxiety, Depression, Diverticulitis, Diverticulosis, Fibroadenoma of breast, right, Insomnia, Lumbar disc disorder, Mitral valve regurgitation, MVP (mitral valve prolapse), Syncope, and Vitamin D deficiency.   Ms. Lizeth Delgado  has a past surgical history that includes Breast surgery (Right, 2014); Breast biopsy (Right, 2013); Colonoscopy (2016); and Lumbar puncture (12/07/2022). 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           Learning:   Does the patient/guardian have any barriers to learning?: No barriers     Fall Risk Scale: Ferris Total Score: 40  Ferris Fall Risk: Medium (25-44)           OBJECTIVE   External Observations: (rectal examination)  Voluntary contraction: [] absent     [x] present  Voluntary relaxation: [] absent     [x] present  Involuntary contraction: [] absent     [x] present  Involuntary relaxation: [] absent     [x] present  Perineal descent at rest: [x] absent     [] present  Perineal descent with bearing: [x] absent     [] present  Skin integrity: no concerns      Pelvic Floor Muscle (PFM) Assessment: (via rectal canal)  Muscle volume: [] decreased     [] WNL     [x] Defect  PFM tension: [] decreased     [x] increased     [] WNL    Contraction ability:  Voluntary contraction: [] absent     [] weak     [] moderate      [x] strong  Voluntary relaxation: [] absent     [] partial   [] complete   [x] delayed    [x] incomplete      Palpation: via rectal canal   Superficial Pelvic Floor Musculature (PFM): Tender? Intermediate PFM Tender? Deep PFM Tender?    Superficial Transverse Perineal [] Right  [] Left  []DNT Deep Transverse Perineal [] Right  [] Left  []DNT Puborectalis [x] Right  [x] Left  []DNT   Ischiocavernosus [] Right  [] Left  []DNT Compressor Urethra [] Right  [] Left  []DNT Pubococcygeus [] Right  [x] Left  []DNT   Bulbocavernosus [] Right  [] Left  []DNT   Iliococcygeus [x] Right  [x] Left  []DNT       Obturator Internus [x] Right  [x] Left  []DNT       Coccygeus [x] Right  [x] Left  []DNT       Breath assessment:  Observation: chest breathing dominant  Coordination of pelvic floor muscles with breath: no pelvic floor muscle excursion  Co-contraction of PFM with transversus abdominis: absent      ASSESSMENT   Initial Assessment:  Sharifa Llanos presents with musculoskeletal limitations including pelvic floor muscle (PFM) tension, reduced PFM Range of Motion (ROM), increased tenderness to palpation of the PFM, altered body mechanics, and poor diaphragm excursion. These limitations are impairing the patient's ability to properly coordinate perineal elevation and descent for normalized PFM function, contributing to bowel dysfunction. As a result, she is limited in her/his ability to participate in household chores, physical activities such as walking, swimming, or other exercise, and entertainment activities such as going to a movie or concert. Problem List: (Impacting functional limitations): Body Structures, Functions, Activity Limitations Requiring Skilled Therapeutic Intervention: Decreased ROM; Decreased body mechanics; Decreased fine motor control     Therapy Prognosis:   Therapy Prognosis: Excellent; Good     Initial Assessment Complexity:   Decision Making: Medium Complexity    PLAN   Effective Dates: 1/9/23 TO   04/09/23   Frequency/Duration: Plan Frequency: 1x/week   Interventions Planned (Treatment may consist of any combination of the following):          Goals: (Goals have been discussed and agreed upon with patient.)  Short-Term Functional Goals: Time Frame: 6 weeks  Pt will demonstrate I with basic PFM HEP to improve awareness, coordination, and timing of PFM. Patient will demonstrate understanding of and ability to teach back appropriate water and fiber intake, toileting frequency, and positioning for improved self-management of symptoms. Patient will demonstrate understanding of and ability to teach back two strategies to reduce constipation and improve toileting to minimize strain on and/or paradoxical movement of the pelvic floor muscles.   Patient will demonstrate ability to perform diaphragmatic breathing in multiple positions in order to improve pelvic floor muscle (PFM) lengthening and reduced discomfort and/or straining to aid in bowel evacuation. Patient will engage in 30 minutes of physical activity per day in order to assist in digestion and ease of passing stools. Discharge Goals: Time Frame: 12 weeks  Patient will demonstrate ability to increase intra-abdominal pressure and eccentrically contract the pelvic floor muscles without breath holding, as assessed by digitally or with use of sEMG biofeedback, in order to improve bowel evacuation. Patient will report minimal to no pain upon examination of the levator ani muscles and report minimal or no discomfort with bowel evacuation. Patient will improve outcome score by >12%. Outcome Measure:   Pelvic Floor Outcome Measure:  Pelvic Floor Impact Questionnaire--short form 7 (PFIQ-7):  Score:  Initial: 1/9/23  Bladder or Urine: 0/100  Bowel or Rectum: 42/100  Vagina or Pelvis: 24/100 Most Recent: X (Date: -- )  Bladder or Urine: X/100  Bowel or Rectum: X/100  Vagina or Pelvis: X/100   Interpretation of Score: Each of the 7 sections is scored on a scale from 0-3; 0 representing \"Not at all\", 3 representing \"Quite a bit\". The mean value is taken from all the answered items, then multiplied by 100 to obtain the scale score, ranging from 0-100. This process is repeated for each column representing bowel, bladder, and pelvic pain. Medical Necessity:   > Skilled intervention continues to be required due to the above mentioned deficits. Reason For Services/Other Comments:  > Patient continues to require skilled intervention due to the above mentioned deficits. Total Duration: 50 minutes  Time In: 0915  Time Out: 1005    Regarding Charlotte Otoole's therapy, I certify that the treatment plan above will be carried out by a therapist or under their direction.   Thank you for this referral,  Manfred Dixon, PT     Referring Physician Signature: lGadys Linn, DO _______________________________ Date _____________        Post Session Pain  Charge Capture  PT Visit Info MD Guidelines  Rad

## 2023-01-09 NOTE — PROGRESS NOTES
Efren Briceville  : 1985  Primary:  (No info available)  Secondary:  [facility] [address] [address]  Phone: [phone]  Fax: [phone] Plan Frequency: 1x/week  No data recorded    PT Visit Info:  Plan Frequency: 1x/week    Visit Count:  1    OUTPATIENT PHYSICAL THERAPY:OP NOTE TYPE: Treatment Note 2023       Episode  }Appt Desk             Treatment Diagnosis:    reatment Diagnosis:  Lack of coordination (muscle incoordination) (R27.8)  Constipation, unspecified (K59.00)  Muscle spasm (M62.83)  Medical/Referring Diagnosis:  Segmental and somatic dysfunction of pelvic region [M99.05]  Referring Physician:  Biju Hernandez DO MD Orders:  PT Eval and Treat   Date of Onset:  No data recorded   Allergies:   Patient has no known allergies. Restrictions/Precautions:  No data recorded  No data recorded   Interventions Planned (Treatment may consist of any combination of the following):    No data recorded     Subjective Comments:   Constipation    Initial:}   2 2/10Post Session:     2   /10  Medications Last Reviewed:  2023  Updated Objective Findings:  See evaluation note from today  Treatment   THERAPEUTIC ACTIVITY: ( 20 minutes): Functional activity education regarding anatomy, pathology and role of pelvic floor muscle (PFM) function in relation to presenting symptoms and role of pelvic floor therapy in conservative treatment. and Functional activity training to improve toilet positioning and pelvic floor muscle relaxation, to increase anorectal angle in order to improve bowel/bladder emptying and minimize straining/paradoxical PFM activation during defecation.     TA Educational Topic Notes Date Completed   Pathology/Anatomy/PFM Function     Bladder health education     Urinary urge suppression     The knack     Voiding strategies     Nocturia tips     Bowel/Bladder log     Bowel health education     Constipation care Diarrhea/Fecal leakage     Colonic massage     Toilet positioning     Defecation dynamics     Sources of fiber     Return to intercourse/Dilator training     Sexual positioning     Lubricants/vaginal moisturizers     Vulvovaginal health/vaginal irritants     Body mechanics     Posture/ergonomics     Diaphragmatic breathing     Resources and technology     Other patient education          Treatment/Session Summary:    Treatment Assessment: Pt verbalized understanding of POC. All questions answered at this time. Communication/Consultation:  None today  Equipment provided today:  None  Recommendations/Intent for next treatment session: Next visit will focus on 1) review toilet positioning 2) colonic massage 3) sEMG biofeedback for down-training PFM 4) intra-rectal manual therapy as tolerated.     Total Treatment Billable Duration:  50 (20 minutes tx, 30 minutes evaluation)  Time In: 3010  Time Out: [address], PT       Charge Capture  }Post Session Pain  PT Visit Info  [address] Portal  MD Guidelines  Scanned Media  Benefits  MyChart    Future Appointments   Date Time Provider Mitchell Fleming   1/19/2023  8:00 AM Estefanía Jefferson PT [facility]   1/25/2023  3:00 PM Estefanía Jefferson PT [facility]   1/31/2023  8:00 AM Estefanía Jefferson PT [facility]   2/7/2023  8:00 AM Estefanía Jefferson PT [facility]   2/16/2023  1:30 PM Lauren Jean Baptiste,  [facility]

## 2023-01-19 ENCOUNTER — HOSPITAL ENCOUNTER (OUTPATIENT)
Dept: PHYSICAL THERAPY | Age: 38
Setting detail: RECURRING SERIES
Discharge: HOME | End: 2023-01-22
Payer: MEDICAID

## 2023-01-19 ENCOUNTER — TELEPHONE (OUTPATIENT)
Dept: NEUROLOGY | Age: 38
End: 2023-01-19

## 2023-01-19 PROCEDURE — 97110 THERAPEUTIC EXERCISES: CPT

## 2023-01-19 PROCEDURE — 97140 MANUAL THERAPY 1/> REGIONS: CPT

## 2023-01-19 PROCEDURE — 97530 THERAPEUTIC ACTIVITIES: CPT

## 2023-01-19 ASSESSMENT — PAIN SCALES - GENERAL: PAINLEVEL_OUTOF10: 0

## 2023-01-19 NOTE — PROGRESS NOTES
Beata Mujica  : 1985  Primary:  (No info available)  Secondary:  [facility] [address] [address]  Phone: [phone]  Fax: [phone] Plan Frequency: 1x/week  No data recorded    PT Visit Info:  Plan Frequency: 1x/week      Visit Count:  2    OUTPATIENT PHYSICAL THERAPY:OP NOTE TYPE: Treatment Note 2023       Episode  }Appt Desk             Treatment Diagnosis:    reatment Diagnosis:  Lack of coordination (muscle incoordination) (R27.8)  Constipation, unspecified (K59.00)  Muscle spasm (M62.83)  Medical/Referring Diagnosis:  Segmental and somatic dysfunction of pelvic region [M99.05]  Referring Physician:  Saul Edwards DO MD Orders:  PT Eval and Treat   Date of Onset:  No data recorded   Allergies:   Patient has no known allergies. Restrictions/Precautions:  No data recorded  No data recorded   Interventions Planned (Treatment may consist of any combination of the following):    No data recorded     Subjective Comments:   Constipation    Pt reports non-painful BM following previous evaluation. States she is on her cycle today and defers internal treatment. Pt had a BM this morning. Small amount. She does need to strain. Initial:}    0/10Post Session:     2   /10  Medications Last Reviewed:  2023  Updated Objective Findings:    sEMG biofeedback:  Resting PFM activity <2.0 mV in supine and seated position. Mild increase with push efforts. Treatment   THERAPEUTIC ACTIVITY: ( 40 minutes): Functional activity education regarding anatomy, pathology and role of pelvic floor muscle (PFM) function in relation to presenting symptoms and role of pelvic floor therapy in conservative treatment.  and Functional activity training to improve toilet positioning and pelvic floor muscle relaxation, to increase anorectal angle in order to improve bowel/bladder emptying and minimize straining/paradoxical PFM activation during defecation. TA Educational Topic Notes Date Completed   Pathology/Anatomy/PFM Function Reviewed 1/19/23   Bladder health education     Urinary urge suppression     The knack     Voiding strategies     Nocturia tips     Bowel/Bladder log     Bowel health education     Constipation care     Diarrhea/Fecal leakage     Colonic massage Completed, provided handout 1/19/23   Toilet positioning     Defecation dynamics Seated with sEMG biofeedback 1/19/23   Sources of fiber     Return to intercourse/Dilator training     Sexual positioning     Lubricants/vaginal moisturizers     Vulvovaginal health/vaginal irritants     Body mechanics     Posture/ergonomics     Diaphragmatic breathing Supine, seated with sEMG biofeedback 1/19/23   Resources and technology     Other patient education          MANUAL THERAPY: (10 minutes): Soft tissue mobilization was utilized and necessary due to ST restrictions  Date Type Location Comments   1/19/2023 Internal assessment/treatment Via vaginal and rectal canal Held today    Colonic massage Abdomen 10 min                                 (Used abbreviations: MET - muscle energy technique; SCS- Strain counter strain; CTM-Connective tissue mobilizations; CR- Contract/Relax; SP- Sustained pressure; PIT- Positional inhibition techniques; STM Soft -tissue mobilization; MM- Myofascial mobilization; TrP-Trigger point release; IASTM- Instrument assisted soft tissue mobilizations, TDN-Trigger point dry needling)        THERAPEUTIC EXERCISE: (7 minutes):    Exercises per grid below to improve mobility. Required minimal verbal cues to promote proper body breathing techniques. Progressed range, repetitions, and complexity of movement as indicated.    Date:  1/19/23 Date:   Date:     Activity/Exercise Parameters Parameters Parameters   Sundeep pose 3 x 1 min     Supine PF stretch 3 x 30 sec     HEP Reviewed          Treatment/Session Summary:    Treatment Assessment: Pt demonstrated ability to appropriately maintain PFM relaxation/lengthening pairing diaphragmatic breathing and toilet positioning during push efforts by end of session. Tension in her right upper abdominal quadrant present, but non-painful. Communication/Consultation:  None today  Equipment provided today:  None  Recommendations/Intent for next treatment session: Next visit will focus on 1) review toilet positioning 2) Review colonic massage 3) sEMG biofeedback for down-training PFM 4) intra-rectal manual therapy as tolerated.     Total Treatment Billable Duration:  57 minutes  Time In: 0805  Time Out: [address] [facility], PT       Charge Capture  }Post Session Pain  PT Visit Info  [facility] Portal  MD Guidelines  Scanned Media  Benefits  MyChart    Future Appointments   Date Time Provider Mitchell Fleming   1/25/2023  3:00 PM Angel sue [address] [facility]   1/31/2023  8:00 AM Angel sue PT [facility]   2/7/2023  8:00 AM Angel sue PT [facility]   2/16/2023  1:30 PM Alvino Nolan DO [facility]

## 2023-01-19 NOTE — TELEPHONE ENCOUNTER
Dr Funmilayo Jones called and needed the ok from MARCELO Haskins to pull a tooth? Per Valerie Haskins, ok to pull tooth.

## 2023-01-31 ENCOUNTER — HOSPITAL ENCOUNTER (OUTPATIENT)
Dept: PHYSICAL THERAPY | Age: 38
Setting detail: RECURRING SERIES
Discharge: HOME | End: 2023-02-03
Payer: COMMERCIAL

## 2023-01-31 PROCEDURE — 97110 THERAPEUTIC EXERCISES: CPT

## 2023-01-31 PROCEDURE — 97140 MANUAL THERAPY 1/> REGIONS: CPT

## 2023-01-31 PROCEDURE — 97530 THERAPEUTIC ACTIVITIES: CPT

## 2023-01-31 NOTE — PROGRESS NOTES
Obdulia Kaufman  : 1985  Primary:  (No info available)  Secondary:  [facility] [address] [address]  Phone: [phone]  Fax: [phone] Plan Frequency: 1x/week  No data recorded    PT Visit Info:  Plan Frequency: 1x/week      Visit Count:  3    OUTPATIENT PHYSICAL THERAPY:OP NOTE TYPE: Treatment Note 2023       Episode  }Appt Desk               reatment Diagnosis:  Lack of coordination (muscle incoordination) (R27.8)  Constipation, unspecified (K59.00)  Muscle spasm (M62.83)  Medical/Referring Diagnosis:  Segmental and somatic dysfunction of pelvic region [M99.05]  Referring Physician:  Anjana Yin DO MD Orders:  PT Eval and Treat   Date of Onset:  No data recorded   Allergies:   Patient has no known allergies. Restrictions/Precautions:  No data recorded  No data recorded   Interventions Planned (Treatment may consist of any combination of the following):    No data recorded     Subjective Comments:   Constipation    Overall, pain has been less with BM's. BM's have been variable. She describes reduced sensation of her who inside ripping out when trying to have  BM. She notices after she passes a [place] 1 stool, she is able to have a BM with use of breathing strategy. She had an anal fissure present. She is starting her breathing as soon as she feels an urge. Initial:}  0   /10Post Session:     0   /10  Medications Last Reviewed:  2023  Updated Objective Findings:    sEMG biofeedback:  Resting PFM activity <2.0 mV in supine and seated position. Mild increase with push efforts. Sharp pain present with palpation of LA muscles. Global PFM overactivity present    Treatment   THERAPEUTIC ACTIVITY: (20 minutes): Functional activity education regarding anatomy, pathology and role of pelvic floor muscle (PFM) function in relation to presenting symptoms and role of pelvic floor therapy in conservative treatment.  and Functional activity training to improve toilet positioning and pelvic floor muscle relaxation, to increase anorectal angle in order to improve bowel/bladder emptying and minimize straining/paradoxical PFM activation during defecation.    TA Educational Topic Notes Date Completed   Pathology/Anatomy/PFM Function Reviewed 1/19/23   Bladder health education     Urinary urge suppression     The knack     Voiding strategies     Nocturia tips     Bowel/Bladder log     Bowel health education     Constipation care     Diarrhea/Fecal leakage     Colonic massage Completed, provided handout  Reviewed timing, rationale, and process 1/19/23 1/31/23   Toilet positioning     Defecation dynamics Seated with sEMG biofeedback    With intra-rectal cues 1/19/23 1/31/23   Sources of fiber     Return to intercourse/Dilator training     Sexual positioning     Lubricants/vaginal moisturizers     Vulvovaginal health/vaginal irritants     Body mechanics     Posture/ergonomics     Diaphragmatic breathing Supine, seated with sEMG biofeedback  Reviewed 1/19/23 1/31/23   Resources and technology     Other patient education PF check-ins for tension reduction throughout the day 1/31/23        MANUAL THERAPY: (20 minutes):   Soft tissue mobilization was utilized and necessary due to ST restrictions  Date Type Location Comments   1/31/2023 Internal assessment/treatment Via rectal canal SP at [place], digital sweeping as tolerable through LA muscles and EAS.     Colonic massage Abdomen 10 min                                 (Used abbreviations: MET - muscle energy technique; SCS- Strain counter strain; CTM-Connective tissue mobilizations; CR- Contract/Relax; SP- Sustained pressure; PIT- Positional inhibition techniques; STM Soft -tissue mobilization; MM- Myofascial mobilization; TrP-Trigger point release; IASTM- Instrument assisted soft tissue mobilizations, TDN-Trigger point dry needling)    THERAPEUTIC EXERCISE: (16 minutes):    Exercises per grid  below to improve mobility.  Required minimal verbal cues to promote proper body breathing techniques.  Progressed range, repetitions, and complexity of movement as indicated.   Date:  1/19/23 Date:  1/31/23 Date:     Activity/Exercise Parameters Parameters Parameters   Cat/Cow  X 10    Sundeep pose 3 x 1 min 3 x 1 min    Supine PF stretch 3 x 30 sec 3 x 30 sec    Supine active hip IR/ER  X 10    HEP Reviewed          Treatment/Session Summary:    Treatment Assessment Pt continues to present with levator ani overactivity and incoordination. She has difficulty voluntarily lengthening her PFM at rest and with pressure applied to the musculature. She improved by the end of the session with verbal and tactile cues, however has limited tolerance to palpation of her muscles due to pain present.       Communication/Consultation:  None today  Equipment provided today:  None  Recommendations/Intent for next treatment session: Next visit will focus on 1) review toilet positioning 2) Review colonic massage 3) sEMG biofeedback for down-training PFM 4) intra-rectal manual therapy as tolerated.    Total Treatment Billable Duration:  56 minutes       Patrizia Hameed, PT       Charge Capture  }Post Session Pain  PT Visit Info  [place] [facility] Portal  MD Guidelines  Scanned Media  Benefits  MyChart    Future Appointments   Date Time Provider Department Center   2/7/2023  8:00 AM Patrizia Hameed, PT [facility]   2/16/2023  1:30 PM Ben Pepe DO [facility]   2/21/2023  8:00 AM Patrizia Hameed, PT [facility]   3/2/2023  8:00 AM Patrizia Hameed, PT [facility]   3/8/2023  8:00 AM Patrizia Hameed, PT [facility]

## 2023-02-07 ENCOUNTER — HOSPITAL ENCOUNTER (OUTPATIENT)
Dept: PHYSICAL THERAPY | Age: 38
Setting detail: RECURRING SERIES
Discharge: HOME | End: 2023-02-10
Payer: COMMERCIAL

## 2023-02-07 PROCEDURE — 97110 THERAPEUTIC EXERCISES: CPT

## 2023-02-07 PROCEDURE — 97530 THERAPEUTIC ACTIVITIES: CPT

## 2023-02-07 PROCEDURE — 97140 MANUAL THERAPY 1/> REGIONS: CPT

## 2023-02-07 ASSESSMENT — PAIN SCALES - GENERAL: PAINLEVEL_OUTOF10: 4

## 2023-02-07 NOTE — PROGRESS NOTES
Kameron Lynn  : 1985  Primary:  (No info available)  Secondary:  [facility] [place] [address]  [address] [address]  Phone: [phone]  Fax: [phone] Plan Frequency: 1x/week  No data recorded    PT Visit Info:  Plan Frequency: 1x/week      Visit Count:  4    OUTPATIENT PHYSICAL THERAPY:OP NOTE TYPE: Treatment Note 2023       Episode  }Appt Desk               reatment Diagnosis:  Lack of coordination (muscle incoordination) (R27.8)  Constipation, unspecified (K59.00)  Muscle spasm (M62.83)  Medical/Referring Diagnosis:  Segmental and somatic dysfunction of pelvic region [M99.05]  Referring Physician:  Emma Araiza DO MD Orders:  PT Eval and Treat   Date of Onset:  No data recorded   Allergies:   Patient has no known allergies. Restrictions/Precautions:  No data recorded  No data recorded   Interventions Planned (Treatment may consist of any combination of the following):    No data recorded     Subjective Comments:  Pt states she has had looser stools over the last 24 hours. She has had 5 BM's this morning. Pt states overall she is having improved bowel evacuation with use of stool. She had one hard stool which was difficulty to evacuate in last week. She feels lower abdominal cramping today. Pt often defers urges for bowel when at work due to fear of using public restrooms for BM. States she only has BM's at home. Initial:}  4 (lower abdomen) 4/10Post Session:     0 (lower abdomen)  /10  Medications Last Reviewed:  2023  Updated Objective Findings:    sEMG biofeedback: Held as pt deferred this today. Resting PFM activity <2.0 mV in supine and seated position. Mild increase with push efforts. Sharp pain present with palpation of LA muscles.  Global PFM overactivity present    Treatment   THERAPEUTIC ACTIVITY: (10 minutes): Functional activity education regarding anatomy, pathology and role of pelvic floor muscle (PFM) function in relation to presenting symptoms and role of pelvic floor therapy in conservative treatment. and Functional activity training to improve toilet positioning and pelvic floor muscle relaxation, to increase anorectal angle in order to improve bowel/bladder emptying and minimize straining/paradoxical PFM activation during defecation. TA Educational Topic Notes Date Completed   Pathology/Anatomy/PFM Function Reviewed 1/19/23   Bladder health education     Urinary urge suppression     The knack     Voiding strategies     Nocturia tips     Bowel/Bladder log     Bowel health education     Constipation care     Diarrhea/Fecal leakage     Colonic massage Completed, provided handout  Reviewed timing, rationale, and process 1/19/23 1/31/23   Toilet positioning     Defecation dynamics Seated with sEMG biofeedback    With intra-rectal cues  Reviewed 1/19/23 1/31/23 2/7/23   Sources of fiber     Return to intercourse/Dilator training     Sexual positioning     Lubricants/vaginal moisturizers     Vulvovaginal health/vaginal irritants     Body mechanics     Posture/ergonomics Reviewed toilet positioning, responding to urges when they they occur versus defer in public situations 54/19   Diaphragmatic breathing Supine, seated with sEMG biofeedback  Reviewed 1/19/23 1/31/23   Resources and technology     Other patient education PF check-ins for tension reduction throughout the day 1/31/23        MANUAL THERAPY: (20 minutes): Soft tissue mobilization was utilized and necessary due to ST restrictions  Date Type Location Comments   2/7/2023 Internal assessment/treatment Via rectal canal SP at [place], digital sweeping as tolerable through LA muscles and EAS.      Colonic massage Abdomen 10 min                                 (Used abbreviations: MET - muscle energy technique; SCS- Strain counter strain; CTM-Connective tissue mobilizations; CR- Contract/Relax; SP- Sustained pressure; PIT- Positional inhibition techniques; STM Soft -tissue mobilization; MM- Myofascial mobilization; TrP-Trigger point release; IASTM- Instrument assisted soft tissue mobilizations, TDN-Trigger point dry needling)    THERAPEUTIC EXERCISE: (15 minutes):    Exercises per grid below to improve mobility. Required minimal verbal cues to promote proper body breathing techniques. Progressed range, repetitions, and complexity of movement as indicated. Date:  1/19/23 Date:  1/31/23 Date:  2/7/23   Activity/Exercise Parameters Parameters Parameters   Cat/Cow  X 10 X 20   Sundeep pose 3 x 1 min 3 x 1 min 3 x 1 min with diaphragmatic breathing   Supine PF stretch 3 x 30 sec 3 x 30 sec 3 x 30 sec   Supine active hip IR/ER  X 10 2 x 10   Adductor stretch   Mantis 3 x 30 sec   HEP Reviewed          Treatment/Session Summary:    Treatment Assessment Pt deferred internal treatment and biofeedback today due to bout of loose stools this morning. She is seeing improvement in bowel evacuation with strategies discussed in her treatment sessions. She feels improved motility with use of colonic massage to aid in digestion. Communication/Consultation:  None today  Equipment provided today:  None  Recommendations/Intent for next treatment session: Next visit will focus on 1) review toilet positioning 2) Review colonic massage 3) sEMG biofeedback for down-training PFM 4) intra-rectal manual therapy as tolerated.     Total Treatment Billable Duration:  45 minutes  Time In: 0805  Time Out: [address], PT       Charge Capture  }Post Session Pain  PT Visit Info  [address] [facility] Portal  MD Guidelines  Scanned Media  Benefits  MyChart    Future Appointments   Date Time Provider Mitchell Fleming   2/16/2023  1:30 PM Ap Cunningham, DO [facility]   2/21/2023  8:00 AM Angel sue PT [facility]   3/2/2023  8:00 AM Angel sue PT [facility]   3/8/2023  8:00 AM Angel sue PT [facility]

## 2023-02-16 ENCOUNTER — OFFICE VISIT (OUTPATIENT)
Dept: NEUROLOGY | Age: 38
End: 2023-02-16

## 2023-02-16 VITALS
DIASTOLIC BLOOD PRESSURE: 69 MMHG | OXYGEN SATURATION: 98 % | BODY MASS INDEX: 28.73 KG/M2 | WEIGHT: 189.6 LBS | HEIGHT: 68 IN | SYSTOLIC BLOOD PRESSURE: 114 MMHG | HEART RATE: 84 BPM

## 2023-02-16 DIAGNOSIS — G93.2 IDIOPATHIC INTRACRANIAL HYPERTENSION: Primary | ICD-10-CM

## 2023-02-16 RX ORDER — ERENUMAB-AOOE 140 MG/ML
140 INJECTION, SOLUTION SUBCUTANEOUS
COMMUNITY

## 2023-02-16 NOTE — PROGRESS NOTES
[facility]  [address] [facility]  [address] [facility], [address] [facility]      Chief Complaint   Patient presents with    Other     Idelijael Lawson is a 40 y.o. female who presents on referral from recently seeing the patient in the hospital.    Interval history: She has lost 22 lbs since last seen and is doing well. She feels like her vision is somewhat better. Tolerating Diamox to some extent. History of present illness,    \"The patient has idiopathic intracranial hypertension based off MRI and findings on ophthalmologically examination. She was tried on Diamox but could not tolerate it. She would like to try something different. She continues to have daily headaches. \"             Current Outpatient Medications:     Erenumab-aooe (AIMOVIG) 140 MG/ML SOAJ, Inject 140 mg into the skin every 30 days, Disp: , Rfl:     valACYclovir (VALTREX) 500 MG tablet, Take 1 tablet by mouth daily, Disp: 90 tablet, Rfl: 4    acetaZOLAMIDE (DIAMOX) 500 MG extended release capsule, Take 1 capsule by mouth 2 times daily, Disp: 60 capsule, Rfl: 3    lubiprostone (AMITIZA) 24 MCG capsule, Take 24 mcg by mouth 2 times daily, Disp: , Rfl:     cyclobenzaprine (FLEXERIL) 10 MG tablet, TAKE 1/2-1 TABLET BY MOUTH EVERY 8 HOURS AS NEEDED FOR MUSCLE ACHES AND SPASMS, Disp: , Rfl:     SENEXON-S 8.6-50 MG per tablet, TAKE 1 TABLET BY MOUTH 2 (TWO) TIMES A DAY STOP SENNA, Disp: , Rfl:     pantoprazole (PROTONIX) 40 MG tablet, Take 40 mg by mouth daily, Disp: , Rfl:     acetaminophen (TYLENOL) 500 MG tablet, Take 500 mg by mouth as needed, Disp: , Rfl:     naproxen (NAPROSYN) 500 MG tablet, Take 1 tablet by mouth in the morning and 1 tablet in the evening. Take with meals. Do all this for 5 days. , Disp: 10 tablet, Rfl: 0    No Known Allergies    REVIEW OF SYSTEMS:  CONSTITUTIONAL: No weight loss, fever, chills, but positive weakness and fatigue  HEENT: Eyes: No visual loss, blurred vision, double vision or yellow sclerae. Ears, Nose, Throat: No hearing loss, sneezing, congestion, runny nose or sore throat. Physical Examination  Visit Vitals  /73 (BP 1 Location: Left upper arm)   Pulse 70   Ht 5' 8\" (1.727 m)   Wt 205 lb (93 kg)   SpO2 99%   BMI 31.17 kg/m²       General - Well developed, well nourished, in no apparent distress. Pleasant and conversant. HEENT - Normocephalic, atraumatic. Conjunctiva, tympanic membranes, and oropharynx are clear. Neck - Supple without masses, no bruits   Cardiovascular - Regular rate and rhythm. Normal S1, S2 without murmurs, rubs, or gallops. Lungs - Clear to auscultation. Abdomen - Soft, nontender with normal bowel sounds. Extremities - Peripheral pulses intact. No edema and no rashes. Neurological examination - Comprehension, attention , memory and reasoning are intact. Language and speech are normal. On cranial nerve examination pupils are equal round and reactive to light. Funduscopic examination shows  bilateral papilledema but I cannot see the entire optic disc without a dilated exam.  Visual acuity is adequate. Visual fields are full to finger confrontation. Extraocular motility is normal. Face is symmetric and sensation is intact to light touch. Hearing is intact to finger rustle bilaterally. Motor examination - There is normal muscle tone and bulk. Power is full throughout. Muscle stretch reflexes are normoactive and there are no pathological reflexes present. Sensation is intact to light touch, pinprick, vibration and proprioception in all extremities. Cerebellar examination is normal. Gait and stance are normal.     Most recent CT  - I personally reviewed this image   Results from Hospital Encounter encounter on 05/29/21    CT HEAD WO CONT    Narrative  Noncontrast CT of the brain. COMPARISON: June 2019    INDICATION: Blackout.     TECHNIQUE: Contiguous axial images were obtained from the skull base through the  vertex without IV contrast. Radiation dose reduction techniques were used for  this study:  Our CT scanners use one or all of the following: Automated exposure  control, adjustment of the mA and/or kVp according to patient's size, iterative  reconstruction. FINDINGS:    There is no acute intracranial hemorrhage or evidence for acute territorial  infarction. There is no mass effect, midline shift or hydrocephalus. There is no  extra-axial fluid collection. The cerebellum and brainstem are grossly  unremarkable. Included globes appear intact. The visualized paranasal sinuses and the mastoid  air cells are aerated. There is no skull fracture. Impression  1. No CT evidence of acute intracranial process. No mass effect. Most recent MRI - I personally reviewed this image   Results from [place] encounter on 06/14/21    MRI BRAIN WO CONT    Narrative  EXAMINATION: BRAIN MRI 6/14/2021 7:09 AM    ACCESSION NUMBER: [id number]    INDICATION: Idiopathic intracranial hypertension, possible Chiari malformation,  and worsening headaches with \"blackout spells\"    COMPARISON: Brain MRI and MR venogram 1/8/2021    TECHNIQUE: Multiplanar multisequence MRI of the brain without the administration  of intravenous contrast.    FINDINGS:    The cerebellar tonsils are positioned approximately 0.8 cm below the level of  foramen magnum. This is unchanged from the prior within limits of differences in  measurement technique. There is unchanged mild crowding of foramen magnum. There is a partially empty sella, also unchanged from the prior. The described prominent fluid within the optic nerve sheaths on the prior exams  is not well appreciated absent orbital specific sequences. There are very few punctate T2 hyperintensities in the periventricular and  subcortical white matter, unchanged from the prior. There is T1 and T2 hyperintensity at the left petrous apex (axial FLAIR image  8).  There is no associated restricted diffusion or osseous structures and. This  most likely represents trapped fluid within a pneumatized petrous apex and is  unchanged from the prior. Diffusion imaging shows no evidence of acute infarction or other acute  abnormality. The expected large intracranial vascular flow voids are preserved. There is no  evidence of intracranial blood products. The ventricles are within normal limits in caliber. There is no midline shift. There are no suspicious osseous lesions. Impression  1. Unchanged positioning of the cerebellar tonsils below the level of foramen  magnum, either a Chiari malformation or consequent to the postulated idiopathic  intracranial hypertension. 2. Unchanged partially empty sella. 3. Unchanged punctate nonspecific white matter T2 hyperintensities. These can be  seen with a variety of etiologies, including being idiopathic and with migraine  headaches. 4. No evidence of new intracranial abnormality in comparison to the MRI of  1/8/2021. VOICE DICTATED BY: Dr. Annia Cruz      Diagnoses and all orders for this visit:    1. Idiopathic intracranial hypertension    2. Chronic migraine    Continue Diamox and weight loss. Very resistant to surgical intervention. Continue with eye exams. Jose Aguila DO    Cumulative time spent on 2/16/23 was between 30 and 39 minutes which included chart review, documentation, and counseling the patient on medical condition.

## 2023-02-17 ENCOUNTER — PATIENT MESSAGE (OUTPATIENT)
Dept: NEUROLOGY | Age: 38
End: 2023-02-17

## 2023-02-21 ENCOUNTER — HOSPITAL ENCOUNTER (OUTPATIENT)
Dept: PHYSICAL THERAPY | Age: 38
Setting detail: RECURRING SERIES
Discharge: HOME | End: 2023-02-24
Payer: COMMERCIAL

## 2023-02-21 PROCEDURE — 97110 THERAPEUTIC EXERCISES: CPT

## 2023-02-21 PROCEDURE — 97140 MANUAL THERAPY 1/> REGIONS: CPT

## 2023-02-21 ASSESSMENT — PAIN SCALES - GENERAL: PAINLEVEL_OUTOF10: 0

## 2023-02-21 NOTE — PROGRESS NOTES
Rey Mcghee  : 1985  Primary:  (No info available)  Secondary:  [facility] [address] [address]  Phone: [phone]  Fax: [phone] Plan Frequency: 1x/week  No data recorded    PT Visit Info:  Plan Frequency: 1x/week      Visit Count:  5    OUTPATIENT PHYSICAL THERAPY:OP NOTE TYPE: Treatment Note 2023       Episode  }Appt Desk               reatment Diagnosis:  Lack of coordination (muscle incoordination) (R27.8)  Constipation, unspecified (K59.00)  Muscle spasm (M62.83)  Medical/Referring Diagnosis:  Segmental and somatic dysfunction of pelvic region [M99.05]  Referring Physician:  Adryan Gross DO MD Orders:  PT Eval and Treat   Date of Onset:  No data recorded   Allergies:   Patient has no known allergies. Restrictions/Precautions:  No data recorded  No data recorded   Interventions Planned (Treatment may consist of any combination of the following):    No data recorded     Subjective Comments:  Pt reduced amount of fiber intake. She is having greater ease in passing BM's. She was able to have a BM at work, although this took 15-20 minutes to evacuate. She reports less pain having BM's. Initial:}    0/10Post Session:     0   /10  Medications Last Reviewed:  2023  Updated Objective Findings:    Mild discomfort and reflexive LA contraction with intra-rectal palpation of PFM. Treatment   THERAPEUTIC ACTIVITY: ( minutes): Functional activity education regarding anatomy, pathology and role of pelvic floor muscle (PFM) function in relation to presenting symptoms and role of pelvic floor therapy in conservative treatment. and Functional activity training to improve toilet positioning and pelvic floor muscle relaxation, to increase anorectal angle in order to improve bowel/bladder emptying and minimize straining/paradoxical PFM activation during defecation.     TA Educational Topic Notes Date Completed Pathology/Anatomy/PFM Function Reviewed 1/19/23   Bladder health education     Urinary urge suppression     The knack     Voiding strategies     Nocturia tips     Bowel/Bladder log     Bowel health education     Constipation care     Diarrhea/Fecal leakage     Colonic massage Completed, provided handout  Reviewed timing, rationale, and process 1/19/23 1/31/23   Toilet positioning     Defecation dynamics Seated with sEMG biofeedback    With intra-rectal cues  Reviewed 1/19/23 1/31/23 2/7/23   Sources of fiber     Return to intercourse/Dilator training     Sexual positioning     Lubricants/vaginal moisturizers     Vulvovaginal health/vaginal irritants     Body mechanics     Posture/ergonomics Reviewed toilet positioning, responding to urges when they they occur versus defer in public situations 47/55   Diaphragmatic breathing Supine, seated with sEMG biofeedback  Reviewed 1/19/23 1/31/23   Resources and technology     Other patient education PF check-ins for tension reduction throughout the day 1/31/23        MANUAL THERAPY: (25 minutes): Soft tissue mobilization was utilized and necessary due to ST restrictions  Date Type Location Comments   2/21/2023 Internal assessment/treatment Via rectal canal SP at SD, digital sweeping as tolerable through LA muscles and EAS. Colonic massage Abdomen 15 min                                 (Used abbreviations: MET - muscle energy technique; SCS- Strain counter strain; CTM-Connective tissue mobilizations; CR- Contract/Relax; SP- Sustained pressure; PIT- Positional inhibition techniques; STM Soft -tissue mobilization; MM- Myofascial mobilization; TrP-Trigger point release; IASTM- Instrument assisted soft tissue mobilizations, TDN-Trigger point dry needling)    THERAPEUTIC EXERCISE: (30 minutes):    Exercises per grid below to improve mobility. Required minimal verbal cues to promote proper body breathing techniques.   Progressed range, repetitions, and complexity of movement as indicated. Date:  1/19/23 Date:  1/31/23 Date:  2/7/23 Date:  2/21/23   Activity/Exercise Parameters Parameters Parameters Parameters   Cat/Cow  X 10 X 20 X 20    Sundeep pose 3 x 1 min 3 x 1 min 3 x 1 min with diaphragmatic breathing 2 x 1 min with diaphragmatic breathing   Supine PF stretch 3 x 30 sec 3 x 30 sec 3 x 30 sec 3 x 30 sec   Supine active hip IR/ER  X 10 2 x 10 X 30    Adductor stretch   Mantis 3 x 30 sec    HEP Reviewed      Lower trunk rotation    X 20         Treatment/Session Summary:    Treatment Assessment  Audible gastric sounds produced via colonic massage which pt felt relief during. Pt with reduction in sharp pain with palpation of LA muscles via intra-rectal canal. Pt continues to demonstrate reflexive muscle guarding with pressure applied to PFM. She describes this also occurs with defecation attempts. Communication/Consultation:  None today  Equipment provided today:  None  Recommendations/Intent for next treatment session: Next visit will focus on intra-rectal manual therapy as tolerated, defecation dynamics with emphasis on maintaining relaxed state in PFM with pressure produced.      Total Treatment Billable Duration:  55 minutes  Time In: 0805  Time Out: 0900    Rui Humphrey PT       Charge Capture  }Post Session Pain  PT Visit Info  [address] Portal  MD Guidelines  Scanned Media  Benefits  MyChart    Future Appointments   Date Time Provider Mitchell Fleming   2/28/2023  9:00 AM Rui Humphrey PT [facility]   3/8/2023  8:00 AM Rui Humphrey PT [facility]   5/23/2023  1:30 PM Karyn Carmona DO [facility]

## 2023-03-02 ENCOUNTER — PATIENT MESSAGE (OUTPATIENT)
Dept: NEUROLOGY | Age: 38
End: 2023-03-02

## 2023-05-11 ENCOUNTER — OFFICE VISIT (OUTPATIENT)
Dept: INTERNAL MEDICINE CLINIC | Facility: CLINIC | Age: 38
End: 2023-05-11
Payer: COMMERCIAL

## 2023-05-11 VITALS
HEART RATE: 74 BPM | DIASTOLIC BLOOD PRESSURE: 73 MMHG | SYSTOLIC BLOOD PRESSURE: 107 MMHG | WEIGHT: 187.6 LBS | BODY MASS INDEX: 28.43 KG/M2 | OXYGEN SATURATION: 100 % | RESPIRATION RATE: 18 BRPM | HEIGHT: 68 IN

## 2023-05-11 DIAGNOSIS — E55.9 VITAMIN D DEFICIENCY: ICD-10-CM

## 2023-05-11 DIAGNOSIS — R25.3 FASCICULATIONS OF MUSCLE: ICD-10-CM

## 2023-05-11 DIAGNOSIS — R20.2 NUMBNESS AND TINGLING: ICD-10-CM

## 2023-05-11 DIAGNOSIS — R53.82 CHRONIC FATIGUE: ICD-10-CM

## 2023-05-11 DIAGNOSIS — K59.09 CHRONIC CONSTIPATION: ICD-10-CM

## 2023-05-11 DIAGNOSIS — R20.0 NUMBNESS AND TINGLING: ICD-10-CM

## 2023-05-11 DIAGNOSIS — R51.9 CHRONIC INTRACTABLE HEADACHE, UNSPECIFIED HEADACHE TYPE: ICD-10-CM

## 2023-05-11 DIAGNOSIS — G89.29 CHRONIC INTRACTABLE HEADACHE, UNSPECIFIED HEADACHE TYPE: ICD-10-CM

## 2023-05-11 DIAGNOSIS — F32.0 MAJOR DEPRESSIVE DISORDER, SINGLE EPISODE, MILD (HCC): Primary | ICD-10-CM

## 2023-05-11 DIAGNOSIS — F32.0 MAJOR DEPRESSIVE DISORDER, SINGLE EPISODE, MILD (HCC): ICD-10-CM

## 2023-05-11 LAB
BASOPHILS # BLD: 0 K/UL (ref 0–0.2)
BASOPHILS NFR BLD: 1 % (ref 0–2)
DIFFERENTIAL METHOD BLD: ABNORMAL
EOSINOPHIL # BLD: 0.1 K/UL (ref 0–0.8)
EOSINOPHIL NFR BLD: 1 % (ref 0.5–7.8)
ERYTHROCYTE [DISTWIDTH] IN BLOOD BY AUTOMATED COUNT: 12 % (ref 11.9–14.6)
HCT VFR BLD AUTO: 38.6 % (ref 35.8–46.3)
HGB BLD-MCNC: 12.7 G/DL (ref 11.7–15.4)
IMM GRANULOCYTES # BLD AUTO: 0 K/UL (ref 0–0.5)
IMM GRANULOCYTES NFR BLD AUTO: 0 % (ref 0–5)
LYMPHOCYTES # BLD: 2.8 K/UL (ref 0.5–4.6)
LYMPHOCYTES NFR BLD: 44 % (ref 13–44)
MCH RBC QN AUTO: 32.2 PG (ref 26.1–32.9)
MCHC RBC AUTO-ENTMCNC: 32.9 G/DL (ref 31.4–35)
MCV RBC AUTO: 98 FL (ref 82–102)
MONOCYTES # BLD: 0.4 K/UL (ref 0.1–1.3)
MONOCYTES NFR BLD: 7 % (ref 4–12)
NEUTS SEG # BLD: 3 K/UL (ref 1.7–8.2)
NEUTS SEG NFR BLD: 47 % (ref 43–78)
NRBC # BLD: 0 K/UL (ref 0–0.2)
PLATELET # BLD AUTO: 299 K/UL (ref 150–450)
PMV BLD AUTO: 9.1 FL (ref 9.4–12.3)
RBC # BLD AUTO: 3.94 M/UL (ref 4.05–5.2)
WBC # BLD AUTO: 6.3 K/UL (ref 4.3–11.1)

## 2023-05-11 PROCEDURE — 99215 OFFICE O/P EST HI 40 MIN: CPT | Performed by: NURSE PRACTITIONER

## 2023-05-11 RX ORDER — ESCITALOPRAM OXALATE 5 MG/1
5 TABLET ORAL DAILY
Qty: 30 TABLET | Refills: 2 | Status: SHIPPED | OUTPATIENT
Start: 2023-05-11 | End: 2023-05-18 | Stop reason: SDUPTHER

## 2023-05-11 SDOH — ECONOMIC STABILITY: FOOD INSECURITY: WITHIN THE PAST 12 MONTHS, THE FOOD YOU BOUGHT JUST DIDN'T LAST AND YOU DIDN'T HAVE MONEY TO GET MORE.: SOMETIMES TRUE

## 2023-05-11 SDOH — ECONOMIC STABILITY: FOOD INSECURITY: WITHIN THE PAST 12 MONTHS, YOU WORRIED THAT YOUR FOOD WOULD RUN OUT BEFORE YOU GOT MONEY TO BUY MORE.: OFTEN TRUE

## 2023-05-11 SDOH — ECONOMIC STABILITY: TRANSPORTATION INSECURITY
IN THE PAST 12 MONTHS, HAS LACK OF TRANSPORTATION KEPT YOU FROM MEETINGS, WORK, OR FROM GETTING THINGS NEEDED FOR DAILY LIVING?: YES

## 2023-05-11 SDOH — ECONOMIC STABILITY: INCOME INSECURITY: HOW HARD IS IT FOR YOU TO PAY FOR THE VERY BASICS LIKE FOOD, HOUSING, MEDICAL CARE, AND HEATING?: HARD

## 2023-05-11 SDOH — ECONOMIC STABILITY: HOUSING INSECURITY
IN THE LAST 12 MONTHS, WAS THERE A TIME WHEN YOU DID NOT HAVE A STEADY PLACE TO SLEEP OR SLEPT IN A SHELTER (INCLUDING NOW)?: NO

## 2023-05-11 ASSESSMENT — ANXIETY QUESTIONNAIRES
IF YOU CHECKED OFF ANY PROBLEMS ON THIS QUESTIONNAIRE, HOW DIFFICULT HAVE THESE PROBLEMS MADE IT FOR YOU TO DO YOUR WORK, TAKE CARE OF THINGS AT HOME, OR GET ALONG WITH OTHER PEOPLE: VERY DIFFICULT
4. TROUBLE RELAXING: 2
6. BECOMING EASILY ANNOYED OR IRRITABLE: 3
2. NOT BEING ABLE TO STOP OR CONTROL WORRYING: 3
1. FEELING NERVOUS, ANXIOUS, OR ON EDGE: 2
3. WORRYING TOO MUCH ABOUT DIFFERENT THINGS: 3
GAD7 TOTAL SCORE: 14
7. FEELING AFRAID AS IF SOMETHING AWFUL MIGHT HAPPEN: 1
5. BEING SO RESTLESS THAT IT IS HARD TO SIT STILL: 0

## 2023-05-11 ASSESSMENT — PATIENT HEALTH QUESTIONNAIRE - PHQ9
9. THOUGHTS THAT YOU WOULD BE BETTER OFF DEAD, OR OF HURTING YOURSELF: 0
2. FEELING DOWN, DEPRESSED OR HOPELESS: 3
SUM OF ALL RESPONSES TO PHQ QUESTIONS 1-9: 16
SUM OF ALL RESPONSES TO PHQ9 QUESTIONS 1 & 2: 4
8. MOVING OR SPEAKING SO SLOWLY THAT OTHER PEOPLE COULD HAVE NOTICED. OR THE OPPOSITE, BEING SO FIGETY OR RESTLESS THAT YOU HAVE BEEN MOVING AROUND A LOT MORE THAN USUAL: 0
10. IF YOU CHECKED OFF ANY PROBLEMS, HOW DIFFICULT HAVE THESE PROBLEMS MADE IT FOR YOU TO DO YOUR WORK, TAKE CARE OF THINGS AT HOME, OR GET ALONG WITH OTHER PEOPLE: 3
4. FEELING TIRED OR HAVING LITTLE ENERGY: 3
3. TROUBLE FALLING OR STAYING ASLEEP: 3
7. TROUBLE CONCENTRATING ON THINGS, SUCH AS READING THE NEWSPAPER OR WATCHING TELEVISION: 1
6. FEELING BAD ABOUT YOURSELF - OR THAT YOU ARE A FAILURE OR HAVE LET YOURSELF OR YOUR FAMILY DOWN: 2
SUM OF ALL RESPONSES TO PHQ QUESTIONS 1-9: 16
SUM OF ALL RESPONSES TO PHQ QUESTIONS 1-9: 16
1. LITTLE INTEREST OR PLEASURE IN DOING THINGS: 1
SUM OF ALL RESPONSES TO PHQ QUESTIONS 1-9: 16
5. POOR APPETITE OR OVEREATING: 3

## 2023-05-12 LAB
25(OH)D3 SERPL-MCNC: 22.2 NG/ML (ref 30–100)
ALBUMIN SERPL-MCNC: 3.8 G/DL (ref 3.5–5)
ALBUMIN/GLOB SERPL: 1.1 (ref 0.4–1.6)
ALP SERPL-CCNC: 70 U/L (ref 50–136)
ALT SERPL-CCNC: 15 U/L (ref 12–65)
ANION GAP SERPL CALC-SCNC: 3 MMOL/L (ref 2–11)
AST SERPL-CCNC: 8 U/L (ref 15–37)
BILIRUB SERPL-MCNC: 0.4 MG/DL (ref 0.2–1.1)
BUN SERPL-MCNC: 15 MG/DL (ref 6–23)
CALCIUM SERPL-MCNC: 8.9 MG/DL (ref 8.3–10.4)
CHLORIDE SERPL-SCNC: 115 MMOL/L (ref 101–110)
CO2 SERPL-SCNC: 22 MMOL/L (ref 21–32)
CREAT SERPL-MCNC: 0.8 MG/DL (ref 0.6–1)
GLOBULIN SER CALC-MCNC: 3.6 G/DL (ref 2.8–4.5)
GLUCOSE SERPL-MCNC: 78 MG/DL (ref 65–100)
MAGNESIUM SERPL-MCNC: 2.3 MG/DL (ref 1.8–2.4)
POTASSIUM SERPL-SCNC: 3.8 MMOL/L (ref 3.5–5.1)
PROT SERPL-MCNC: 7.4 G/DL (ref 6.3–8.2)
SODIUM SERPL-SCNC: 140 MMOL/L (ref 133–143)
TSH, 3RD GENERATION: 1.47 UIU/ML (ref 0.36–3.74)
VIT B12 SERPL-MCNC: 334 PG/ML (ref 193–986)

## 2023-05-16 DIAGNOSIS — G93.2 IDIOPATHIC INTRACRANIAL HYPERTENSION: ICD-10-CM

## 2023-05-16 RX ORDER — ACETAZOLAMIDE 500 MG/1
CAPSULE, EXTENDED RELEASE ORAL
Qty: 60 CAPSULE | Refills: 3 | OUTPATIENT
Start: 2023-05-16

## 2023-05-18 ENCOUNTER — OFFICE VISIT (OUTPATIENT)
Dept: INTERNAL MEDICINE CLINIC | Facility: CLINIC | Age: 38
End: 2023-05-18
Payer: COMMERCIAL

## 2023-05-18 VITALS
BODY MASS INDEX: 28.37 KG/M2 | OXYGEN SATURATION: 100 % | HEART RATE: 63 BPM | HEIGHT: 68 IN | DIASTOLIC BLOOD PRESSURE: 72 MMHG | SYSTOLIC BLOOD PRESSURE: 112 MMHG | WEIGHT: 187.2 LBS

## 2023-05-18 DIAGNOSIS — E53.8 LOW VITAMIN B12 LEVEL: ICD-10-CM

## 2023-05-18 DIAGNOSIS — E55.9 VITAMIN D DEFICIENCY: ICD-10-CM

## 2023-05-18 DIAGNOSIS — F32.0 MAJOR DEPRESSIVE DISORDER, SINGLE EPISODE, MILD (HCC): Primary | ICD-10-CM

## 2023-05-18 PROCEDURE — 99215 OFFICE O/P EST HI 40 MIN: CPT | Performed by: NURSE PRACTITIONER

## 2023-05-18 RX ORDER — ESCITALOPRAM OXALATE 10 MG/1
10 TABLET ORAL DAILY
Qty: 90 TABLET | Refills: 1 | Status: SHIPPED | OUTPATIENT
Start: 2023-05-18

## 2023-05-18 RX ORDER — ERGOCALCIFEROL 1.25 MG/1
CAPSULE ORAL
Qty: 12 CAPSULE | Refills: 1 | Status: SHIPPED | OUTPATIENT
Start: 2023-05-18

## 2023-05-18 ASSESSMENT — PATIENT HEALTH QUESTIONNAIRE - PHQ9
SUM OF ALL RESPONSES TO PHQ QUESTIONS 1-9: 16
2. FEELING DOWN, DEPRESSED OR HOPELESS: 3
4. FEELING TIRED OR HAVING LITTLE ENERGY: 3
SUM OF ALL RESPONSES TO PHQ QUESTIONS 1-9: 16
SUM OF ALL RESPONSES TO PHQ9 QUESTIONS 1 & 2: 4
5. POOR APPETITE OR OVEREATING: 3
10. IF YOU CHECKED OFF ANY PROBLEMS, HOW DIFFICULT HAVE THESE PROBLEMS MADE IT FOR YOU TO DO YOUR WORK, TAKE CARE OF THINGS AT HOME, OR GET ALONG WITH OTHER PEOPLE: 3
8. MOVING OR SPEAKING SO SLOWLY THAT OTHER PEOPLE COULD HAVE NOTICED. OR THE OPPOSITE, BEING SO FIGETY OR RESTLESS THAT YOU HAVE BEEN MOVING AROUND A LOT MORE THAN USUAL: 0
1. LITTLE INTEREST OR PLEASURE IN DOING THINGS: 1
6. FEELING BAD ABOUT YOURSELF - OR THAT YOU ARE A FAILURE OR HAVE LET YOURSELF OR YOUR FAMILY DOWN: 2
9. THOUGHTS THAT YOU WOULD BE BETTER OFF DEAD, OR OF HURTING YOURSELF: 0
SUM OF ALL RESPONSES TO PHQ QUESTIONS 1-9: 16
SUM OF ALL RESPONSES TO PHQ QUESTIONS 1-9: 16
3. TROUBLE FALLING OR STAYING ASLEEP: 3
7. TROUBLE CONCENTRATING ON THINGS, SUCH AS READING THE NEWSPAPER OR WATCHING TELEVISION: 1

## 2023-05-19 DIAGNOSIS — E53.8 LOW VITAMIN B12 LEVEL: ICD-10-CM

## 2023-05-21 LAB — PCA AB SER-ACNC: 12.1 UNITS (ref 0–20)

## 2023-05-24 LAB — METHYLMALONATE SERPL-SCNC: 134 NMOL/L (ref 0–378)

## 2023-06-01 ENCOUNTER — TELEPHONE (OUTPATIENT)
Dept: NEUROLOGY | Age: 38
End: 2023-06-01

## 2023-06-01 DIAGNOSIS — G43.109 CHRONIC MIGRAINE WITH AURA: ICD-10-CM

## 2023-06-01 DIAGNOSIS — G93.2 IDIOPATHIC INTRACRANIAL HYPERTENSION: Primary | ICD-10-CM

## 2023-06-01 PROBLEM — A60.9 HSV (HERPES SIMPLEX VIRUS) ANOGENITAL INFECTION: Status: RESOLVED | Noted: 2022-06-14 | Resolved: 2023-06-01

## 2023-06-01 RX ORDER — ACETAZOLAMIDE 500 MG/1
500 CAPSULE, EXTENDED RELEASE ORAL 2 TIMES DAILY
Qty: 60 CAPSULE | Refills: 3 | Status: SHIPPED | OUTPATIENT
Start: 2023-06-01

## 2023-06-01 RX ORDER — ERENUMAB-AOOE 140 MG/ML
140 INJECTION, SOLUTION SUBCUTANEOUS
Qty: 3 ML | Refills: 3 | Status: SHIPPED | OUTPATIENT
Start: 2023-06-01

## 2023-06-08 ENCOUNTER — OFFICE VISIT (OUTPATIENT)
Dept: INTERNAL MEDICINE CLINIC | Facility: CLINIC | Age: 38
End: 2023-06-08
Payer: COMMERCIAL

## 2023-06-08 VITALS
HEART RATE: 100 BPM | SYSTOLIC BLOOD PRESSURE: 114 MMHG | WEIGHT: 185.6 LBS | DIASTOLIC BLOOD PRESSURE: 68 MMHG | OXYGEN SATURATION: 99 % | BODY MASS INDEX: 28.22 KG/M2

## 2023-06-08 DIAGNOSIS — Z20.2 STD EXPOSURE: ICD-10-CM

## 2023-06-08 DIAGNOSIS — F32.2 CURRENT SEVERE EPISODE OF MAJOR DEPRESSIVE DISORDER WITHOUT PSYCHOTIC FEATURES WITHOUT PRIOR EPISODE (HCC): Primary | ICD-10-CM

## 2023-06-08 PROCEDURE — 99215 OFFICE O/P EST HI 40 MIN: CPT | Performed by: NURSE PRACTITIONER

## 2023-06-08 ASSESSMENT — PATIENT HEALTH QUESTIONNAIRE - PHQ9
SUM OF ALL RESPONSES TO PHQ QUESTIONS 1-9: 23
6. FEELING BAD ABOUT YOURSELF - OR THAT YOU ARE A FAILURE OR HAVE LET YOURSELF OR YOUR FAMILY DOWN: 2
SUM OF ALL RESPONSES TO PHQ QUESTIONS 1-9: 20
7. TROUBLE CONCENTRATING ON THINGS, SUCH AS READING THE NEWSPAPER OR WATCHING TELEVISION: 3
3. TROUBLE FALLING OR STAYING ASLEEP: 3
SUM OF ALL RESPONSES TO PHQ QUESTIONS 1-9: 23
4. FEELING TIRED OR HAVING LITTLE ENERGY: 3
5. POOR APPETITE OR OVEREATING: 3
SUM OF ALL RESPONSES TO PHQ9 QUESTIONS 1 & 2: 6
2. FEELING DOWN, DEPRESSED OR HOPELESS: 3
1. LITTLE INTEREST OR PLEASURE IN DOING THINGS: 3
9. THOUGHTS THAT YOU WOULD BE BETTER OFF DEAD, OR OF HURTING YOURSELF: 3
10. IF YOU CHECKED OFF ANY PROBLEMS, HOW DIFFICULT HAVE THESE PROBLEMS MADE IT FOR YOU TO DO YOUR WORK, TAKE CARE OF THINGS AT HOME, OR GET ALONG WITH OTHER PEOPLE: 2
8. MOVING OR SPEAKING SO SLOWLY THAT OTHER PEOPLE COULD HAVE NOTICED. OR THE OPPOSITE, BEING SO FIGETY OR RESTLESS THAT YOU HAVE BEEN MOVING AROUND A LOT MORE THAN USUAL: 0
SUM OF ALL RESPONSES TO PHQ QUESTIONS 1-9: 23

## 2023-06-08 ASSESSMENT — COLUMBIA-SUICIDE SEVERITY RATING SCALE - C-SSRS
1. WITHIN THE PAST MONTH, HAVE YOU WISHED YOU WERE DEAD OR WISHED YOU COULD GO TO SLEEP AND NOT WAKE UP?: YES
5. HAVE YOU STARTED TO WORK OUT OR WORKED OUT THE DETAILS OF HOW TO KILL YOURSELF? DO YOU INTEND TO CARRY OUT THIS PLAN?: YES
4. HAVE YOU HAD THESE THOUGHTS AND HAD SOME INTENTION OF ACTING ON THEM?: NO
2. HAVE YOU ACTUALLY HAD ANY THOUGHTS OF KILLING YOURSELF?: YES
3. HAVE YOU BEEN THINKING ABOUT HOW YOU MIGHT KILL YOURSELF?: YES
7. DID THIS OCCUR IN THE LAST THREE MONTHS: YES
6. HAVE YOU EVER DONE ANYTHING, STARTED TO DO ANYTHING, OR PREPARED TO DO ANYTHING TO END YOUR LIFE?: YES

## 2023-06-13 LAB
HIV 1+2 AB+HIV1 P24 AG SERPL QL IA: NONREACTIVE
HIV 1/2 RESULT COMMENT: NORMAL

## 2023-07-18 ENCOUNTER — TELEPHONE (OUTPATIENT)
Dept: NEUROLOGY | Age: 38
End: 2023-07-18

## 2023-07-18 NOTE — TELEPHONE ENCOUNTER
Pt calling feeling balance off, headache , pain back of eye, head feels heavy . Her ophthalmologist stating she needs to get seen right away. Suggested pt go to ER . She says they will not do nothing .  Asking to get seen sooner

## 2023-07-20 ENCOUNTER — OFFICE VISIT (OUTPATIENT)
Dept: NEUROLOGY | Age: 38
End: 2023-07-20

## 2023-07-20 VITALS
HEIGHT: 68 IN | WEIGHT: 176.2 LBS | DIASTOLIC BLOOD PRESSURE: 66 MMHG | SYSTOLIC BLOOD PRESSURE: 99 MMHG | HEART RATE: 68 BPM | OXYGEN SATURATION: 100 % | BODY MASS INDEX: 26.7 KG/M2

## 2023-07-20 DIAGNOSIS — G93.2 IDIOPATHIC INTRACRANIAL HYPERTENSION: Primary | ICD-10-CM

## 2023-07-20 DIAGNOSIS — Z51.81 ENCOUNTER FOR MEDICATION MONITORING: ICD-10-CM

## 2023-07-20 DIAGNOSIS — R41.3 MEMORY DIFFICULTIES: ICD-10-CM

## 2023-07-20 DIAGNOSIS — R42 VERTIGO: ICD-10-CM

## 2023-07-20 DIAGNOSIS — G93.2 IDIOPATHIC INTRACRANIAL HYPERTENSION: ICD-10-CM

## 2023-07-20 DIAGNOSIS — H47.10 PAPILLEDEMA, BOTH EYES: ICD-10-CM

## 2023-07-20 DIAGNOSIS — G43.109 CHRONIC MIGRAINE WITH AURA: ICD-10-CM

## 2023-07-20 LAB
ANION GAP SERPL CALC-SCNC: 4 MMOL/L (ref 2–11)
BASOPHILS # BLD: 0 K/UL (ref 0–0.2)
BASOPHILS NFR BLD: 1 % (ref 0–2)
BUN SERPL-MCNC: 11 MG/DL (ref 6–23)
CALCIUM SERPL-MCNC: 8.8 MG/DL (ref 8.3–10.4)
CHLORIDE SERPL-SCNC: 114 MMOL/L (ref 101–110)
CO2 SERPL-SCNC: 21 MMOL/L (ref 21–32)
CREAT SERPL-MCNC: 0.9 MG/DL (ref 0.6–1)
DIFFERENTIAL METHOD BLD: ABNORMAL
EOSINOPHIL # BLD: 0.1 K/UL (ref 0–0.8)
EOSINOPHIL NFR BLD: 2 % (ref 0.5–7.8)
ERYTHROCYTE [DISTWIDTH] IN BLOOD BY AUTOMATED COUNT: 11.8 % (ref 11.9–14.6)
GLUCOSE SERPL-MCNC: 87 MG/DL (ref 65–100)
HCT VFR BLD AUTO: 40.5 % (ref 35.8–46.3)
HGB BLD-MCNC: 13.2 G/DL (ref 11.7–15.4)
IMM GRANULOCYTES # BLD AUTO: 0 K/UL (ref 0–0.5)
IMM GRANULOCYTES NFR BLD AUTO: 0 % (ref 0–5)
LYMPHOCYTES # BLD: 2.2 K/UL (ref 0.5–4.6)
LYMPHOCYTES NFR BLD: 56 % (ref 13–44)
MAGNESIUM SERPL-MCNC: 2.2 MG/DL (ref 1.8–2.4)
MCH RBC QN AUTO: 31.1 PG (ref 26.1–32.9)
MCHC RBC AUTO-ENTMCNC: 32.6 G/DL (ref 31.4–35)
MCV RBC AUTO: 95.3 FL (ref 82–102)
MONOCYTES # BLD: 0.3 K/UL (ref 0.1–1.3)
MONOCYTES NFR BLD: 8 % (ref 4–12)
NEUTS SEG # BLD: 1.3 K/UL (ref 1.7–8.2)
NEUTS SEG NFR BLD: 33 % (ref 43–78)
NRBC # BLD: 0 K/UL (ref 0–0.2)
PLATELET # BLD AUTO: 263 K/UL (ref 150–450)
PMV BLD AUTO: 9.3 FL (ref 9.4–12.3)
POTASSIUM SERPL-SCNC: 3.9 MMOL/L (ref 3.5–5.1)
RBC # BLD AUTO: 4.25 M/UL (ref 4.05–5.2)
SODIUM SERPL-SCNC: 139 MMOL/L (ref 133–143)
WBC # BLD AUTO: 3.8 K/UL (ref 4.3–11.1)

## 2023-07-20 RX ORDER — ACETAZOLAMIDE 250 MG/1
750 TABLET ORAL 2 TIMES DAILY
Qty: 180 TABLET | Refills: 3 | Status: SHIPPED | OUTPATIENT
Start: 2023-07-20

## 2023-07-20 ASSESSMENT — ENCOUNTER SYMPTOMS
GASTROINTESTINAL NEGATIVE: 1
EYE PAIN: 1
RESPIRATORY NEGATIVE: 1
ALLERGIC/IMMUNOLOGIC NEGATIVE: 1

## 2023-07-20 NOTE — PATIENT INSTRUCTIONS
Headache Education:   Instructed the patient on over-the-counter headache management medications including: CoQ10, magnesium oxide, and butterbur. To avoid a pain medication overuse headache trying not to take pain medicines more than 3 doses a week. Avoid use of Fioricet or opioids to treat headaches as this can increase risk for rebound headaches. To help relieve headache symptoms without taking pain medicine lie down under darkroom and drink glass of water. Consider lifestyle modification including good sleep hygiene, routine medial schedules, regular exercise and managing triggers. Keep a headache diary  to reveal triggers and possible patterns. Triggers may be: Food, stress, perfumes, alcohol, or even chocolate. Drink plenty of water and try to get 8 hours of sleep each night to reduce risk factors that may cause headaches. Samples of Nurtec ODT 75 mg and Ubrelvy 100 mg provided to patient. Advised not to take medication on same day and to update office on efficacy. Instructions:  Nurtec ODT 75 mg daily as needed for migraine abortive therapy. Not to exceed 75mg/24 hours. Ubrelvy 100 mg daily as needed for migraine abortive therapy. May repeat for 1 dose in 2 hours if needed. Not to exceed 200mg/24 hours.

## 2023-08-04 ENCOUNTER — HOSPITAL ENCOUNTER (OUTPATIENT)
Dept: MRI IMAGING | Age: 38
Discharge: HOME | End: 2023-08-04
Payer: COMMERCIAL

## 2023-08-04 DIAGNOSIS — H47.10 PAPILLEDEMA, BOTH EYES: ICD-10-CM

## 2023-08-04 DIAGNOSIS — R41.3 MEMORY DIFFICULTIES: ICD-10-CM

## 2023-08-04 DIAGNOSIS — R42 VERTIGO: ICD-10-CM

## 2023-08-04 DIAGNOSIS — G93.2 IDIOPATHIC INTRACRANIAL HYPERTENSION: ICD-10-CM

## 2023-08-04 DIAGNOSIS — G93.2 IDIOPATHIC INTRACRANIAL HYPERTENSION: Primary | ICD-10-CM

## 2023-08-04 PROCEDURE — 70551 MRI BRAIN STEM W/O DYE: CPT

## 2023-08-29 ENCOUNTER — OFFICE VISIT (OUTPATIENT)
Dept: NEUROLOGY | Age: 38
End: 2023-08-29
Payer: MEDICAID

## 2023-08-29 VITALS
BODY MASS INDEX: 27.22 KG/M2 | HEART RATE: 95 BPM | WEIGHT: 179.6 LBS | SYSTOLIC BLOOD PRESSURE: 124 MMHG | DIASTOLIC BLOOD PRESSURE: 64 MMHG | OXYGEN SATURATION: 98 % | HEIGHT: 68 IN

## 2023-08-29 DIAGNOSIS — E55.9 VITAMIN D DEFICIENCY: ICD-10-CM

## 2023-08-29 DIAGNOSIS — G93.2 IDIOPATHIC INTRACRANIAL HYPERTENSION: Primary | ICD-10-CM

## 2023-08-29 DIAGNOSIS — H47.10 PAPILLEDEMA, BOTH EYES: ICD-10-CM

## 2023-08-29 PROCEDURE — 99214 OFFICE O/P EST MOD 30 MIN: CPT | Performed by: NURSE PRACTITIONER

## 2023-08-29 RX ORDER — ACETAZOLAMIDE 250 MG/1
750 TABLET ORAL 2 TIMES DAILY
Qty: 180 TABLET | Refills: 3 | Status: SHIPPED | OUTPATIENT
Start: 2023-08-29

## 2023-08-29 ASSESSMENT — PATIENT HEALTH QUESTIONNAIRE - PHQ9
SUM OF ALL RESPONSES TO PHQ QUESTIONS 1-9: 2
SUM OF ALL RESPONSES TO PHQ QUESTIONS 1-9: 2
SUM OF ALL RESPONSES TO PHQ9 QUESTIONS 1 & 2: 2
1. LITTLE INTEREST OR PLEASURE IN DOING THINGS: 1
SUM OF ALL RESPONSES TO PHQ QUESTIONS 1-9: 2
2. FEELING DOWN, DEPRESSED OR HOPELESS: 1
SUM OF ALL RESPONSES TO PHQ QUESTIONS 1-9: 2

## 2023-08-29 ASSESSMENT — ENCOUNTER SYMPTOMS
GASTROINTESTINAL NEGATIVE: 1
RESPIRATORY NEGATIVE: 1
ALLERGIC/IMMUNOLOGIC NEGATIVE: 1
EYE PAIN: 1

## 2023-08-29 NOTE — PROGRESS NOTES
[facility]  [address]  [address]  [facility]      Chief Complaint   Patient presents with    Follow-up     Gretel Phillips is a 41 y.o. female who presents on referral from Angel Jalloh. Interval history:  She is here today by herself. Continues to endorse headaches with positional changes associated with bilateral papilledema. She has an upcoming appointment with ophthalmology in October, and with neurosurgery at [facility]. She is currently taking Diamox 750 mg twice daily. Continues to endorse numbness and tingling in her hands with occasional myoclonic jerks that have been improving. She is unable to tolerate higher dosages due to side effects. On previous visit, referral was sent to [facility] for assistance with weight management, however unable to meet the requirements for the program and she is not a surgical candidate. She has been watching her dietary intake and increased her exercise, however she is having difficulty losing weight.      Past Medical History:   Diagnosis Date    Anxiety     Depression     Diverticulitis 2011 2016    Diverticulosis 2011    Fibroadenoma of breast, right     Insomnia     Lumbar disc disorder     Mitral valve regurgitation 05/21/2018    1+    MVP (mitral valve prolapse) 05/21/2018    mild anterior leaflet MVP    Syncope     1st occur 2-3 months ag    Vitamin D deficiency 05/29/2018       Past Surgical History:   Procedure Laterality Date    BREAST BIOPSY Right 2013    excision fibroadenoma    BREAST SURGERY Right 2014    biopsy    COLONOSCOPY  2016    Had in [facility], has seen Dr Cr Darnell  12/07/2022       Family History   Problem Relation Age of Onset    Hypertension Father     No Known Problems Mother     Breast Cancer Paternal Aunt     Cancer Paternal Aunt         breast cancer       Social History     Socioeconomic History    Marital status: Single    Years of education: 13

## 2023-09-18 ENCOUNTER — OFFICE VISIT (OUTPATIENT)
Dept: OBGYN CLINIC | Age: 38
End: 2023-09-18
Payer: COMMERCIAL

## 2023-09-18 VITALS
SYSTOLIC BLOOD PRESSURE: 110 MMHG | WEIGHT: 171 LBS | BODY MASS INDEX: 25.91 KG/M2 | DIASTOLIC BLOOD PRESSURE: 78 MMHG | HEIGHT: 68 IN

## 2023-09-18 DIAGNOSIS — G93.2 IDIOPATHIC INTRACRANIAL HYPERTENSION: ICD-10-CM

## 2023-09-18 DIAGNOSIS — N89.8 VAGINAL DISCHARGE: ICD-10-CM

## 2023-09-18 DIAGNOSIS — B00.9 RECURRENT HSV (HERPES SIMPLEX VIRUS): ICD-10-CM

## 2023-09-18 DIAGNOSIS — Z71.1 CONCERN ABOUT STD IN FEMALE WITHOUT DIAGNOSIS: ICD-10-CM

## 2023-09-18 DIAGNOSIS — R30.9 PAINFUL URINATION: ICD-10-CM

## 2023-09-18 DIAGNOSIS — N89.8 VAGINAL ITCHING: Primary | ICD-10-CM

## 2023-09-18 LAB
BILIRUBIN, URINE, POC: NEGATIVE
BLOOD URINE, POC: NEGATIVE
GLUCOSE URINE, POC: NEGATIVE
HBV SURFACE AG SER QL: NONREACTIVE
HCV AB SER QL: NONREACTIVE
HIV 1+2 AB+HIV1 P24 AG SERPL QL IA: NONREACTIVE
HIV 1/2 RESULT COMMENT: NORMAL
KETONES, URINE, POC: NEGATIVE
LEUKOCYTE ESTERASE, URINE, POC: NEGATIVE
NITRITE, URINE, POC: NEGATIVE
PH, URINE, POC: 7.5 (ref 4.6–8)
PROTEIN,URINE, POC: NEGATIVE
SPECIFIC GRAVITY, URINE, POC: 1.02 (ref 1–1.03)
URINALYSIS CLARITY, POC: NORMAL
URINALYSIS COLOR, POC: YELLOW
UROBILINOGEN, POC: NORMAL

## 2023-09-18 PROCEDURE — 81003 URINALYSIS AUTO W/O SCOPE: CPT | Performed by: OBSTETRICS & GYNECOLOGY

## 2023-09-18 PROCEDURE — 99214 OFFICE O/P EST MOD 30 MIN: CPT | Performed by: OBSTETRICS & GYNECOLOGY

## 2023-09-18 RX ORDER — CLINDAMYCIN PHOSPHATE 20 MG/G
CREAM VAGINAL
Qty: 40 G | Refills: 1 | Status: SHIPPED | OUTPATIENT
Start: 2023-09-18 | End: 2023-09-25

## 2023-09-18 RX ORDER — VALACYCLOVIR HYDROCHLORIDE 1 G/1
2000 TABLET, FILM COATED ORAL 2 TIMES DAILY
Qty: 90 TABLET | Refills: 3 | Status: SHIPPED | OUTPATIENT
Start: 2023-09-18

## 2023-09-18 NOTE — PROGRESS NOTES
Lumbar disc disorder     Mitral valve regurgitation 2018    1+    MVP (mitral valve prolapse) 2018    mild anterior leaflet MVP    Syncope     1st occur 2-3 months ag    Vitamin D deficiency 2018     Past Surgical History:   Procedure Laterality Date    BREAST BIOPSY Right 2013    excision fibroadenoma    BREAST SURGERY Right 2014    biopsy    COLONOSCOPY  2016    Had in [facility], has seen Dr Michelle Kenny  2022     Family History   Problem Relation Age of Onset    Hypertension Father     No Known Problems Mother     Breast Cancer Paternal Aunt     Cancer Paternal Aunt         breast cancer      Social History     Tobacco Use    Smoking status: Never    Smokeless tobacco: Never   Substance Use Topics    Alcohol use: No       Social History     Substance and Sexual Activity   Sexual Activity Not Currently    Partners: Male     OB History    Para Term  AB Living   4 2 0 0 2 0   SAB IAB Ectopic Molar Multiple Live Births   0 0 0 0 0 0      # Outcome Date GA Lbr Tres/2nd Weight Sex Delivery Anes PTL Lv   4 AB            3 AB            2 Para            1 Para               Obstetric Comments   Vaginal births       Health Maintenance  Mammogram: n/a  Colonoscopy: n/a  Bone Density: n/a    ROS:    Review of Systems  General: Not Present- Chills, Fever, Fatigue, Insomnia, Hot flashes/Night sweats, Weight gain  Skin: Not Present- Bruising, Change in Wart/Mole, Excessive Sweating, Itching, Nail Changes, New Lesions, Rash, Skin Color Changes and Ulcer. HEENT: Not Present- Headache, Blurred Vision, Double Vision, Glaucoma, Visual Disturbances, Hearing Loss, Ringing in the Ears, Vertigo, Nose Bleed, Bleeding Gums, Hoarseness and Sore Throat. Neck: Not Present- Neck Pain and Neck Swelling. Respiratory: Not Present- Cough, Difficulty Breathing and Difficulty Breathing on Exertion.   Breast: Not Present- Breast Mass, Breast Pain, Breast Swelling, Nipple Discharge, Nipple Pain, Recent

## 2023-09-19 LAB — RPR SER QL: NONREACTIVE

## 2023-09-22 LAB
A VAGINAE DNA VAG QL NAA+PROBE: NORMAL SCORE
BVAB2 DNA VAG QL NAA+PROBE: NORMAL SCORE
C ALBICANS DNA VAG QL NAA+PROBE: NEGATIVE
C GLABRATA DNA VAG QL NAA+PROBE: NEGATIVE
C TRACH RRNA SPEC QL NAA+PROBE: NEGATIVE
CANDIDA KRUSEI: NEGATIVE
CANDIDA LUSITANIAE, NAA, 180015: NEGATIVE
CANDIDA PARAPSILOSIS/TROPICALIS: NEGATIVE
MEGA1 DNA VAG QL NAA+PROBE: NORMAL SCORE
N GONORRHOEA RRNA SPEC QL NAA+PROBE: NEGATIVE
T VAGINALIS RRNA SPEC QL NAA+PROBE: NEGATIVE

## 2023-11-13 PROBLEM — R10.30 LOWER ABDOMINAL PAIN: Status: RESOLVED | Noted: 2020-01-14 | Resolved: 2023-11-13

## 2023-11-13 PROBLEM — B37.31 YEAST VAGINITIS: Status: RESOLVED | Noted: 2018-10-17 | Resolved: 2023-11-13

## 2023-11-13 PROBLEM — R14.0 ABDOMINAL BLOATING: Status: RESOLVED | Noted: 2020-01-15 | Resolved: 2023-11-13

## 2023-11-13 PROBLEM — G47.00 INSOMNIA: Status: RESOLVED | Noted: 2018-06-06 | Resolved: 2023-11-13

## 2023-11-23 DIAGNOSIS — E55.9 VITAMIN D DEFICIENCY: ICD-10-CM

## 2023-11-24 RX ORDER — ERGOCALCIFEROL 1.25 MG/1
50000 CAPSULE ORAL
Qty: 3 CAPSULE | Refills: 4 | Status: SHIPPED | OUTPATIENT
Start: 2023-11-24

## 2023-12-23 DIAGNOSIS — G93.2 IDIOPATHIC INTRACRANIAL HYPERTENSION: ICD-10-CM

## 2023-12-26 RX ORDER — ACETAZOLAMIDE 250 MG/1
TABLET ORAL
Qty: 180 TABLET | Refills: 3 | OUTPATIENT
Start: 2023-12-26

## 2023-12-27 DIAGNOSIS — G93.2 IDIOPATHIC INTRACRANIAL HYPERTENSION: ICD-10-CM

## 2023-12-27 RX ORDER — ACETAZOLAMIDE 250 MG/1
750 TABLET ORAL 2 TIMES DAILY
Qty: 180 TABLET | Refills: 2 | Status: SHIPPED | OUTPATIENT
Start: 2023-12-27

## 2023-12-27 NOTE — TELEPHONE ENCOUNTER
-Smoking Cessation counseling  -Nicotine patch MARCELO Arvizu is out of office will forward refill to MARCELO Dickinson.  Please see pended RX Request.

## 2024-01-08 DIAGNOSIS — G93.2 IDIOPATHIC INTRACRANIAL HYPERTENSION: ICD-10-CM

## 2024-01-08 RX ORDER — ACETAZOLAMIDE 250 MG/1
750 TABLET ORAL 2 TIMES DAILY
Qty: 180 TABLET | Refills: 2 | Status: SHIPPED | OUTPATIENT
Start: 2024-01-08

## 2024-02-15 ENCOUNTER — OFFICE VISIT (OUTPATIENT)
Dept: INTERNAL MEDICINE CLINIC | Facility: CLINIC | Age: 39
End: 2024-02-15
Payer: COMMERCIAL

## 2024-02-15 VITALS
OXYGEN SATURATION: 99 % | BODY MASS INDEX: 25.94 KG/M2 | SYSTOLIC BLOOD PRESSURE: 106 MMHG | DIASTOLIC BLOOD PRESSURE: 64 MMHG | WEIGHT: 170.6 LBS | HEART RATE: 64 BPM

## 2024-02-15 DIAGNOSIS — Z79.899 ON POTASSIUM WASTING DIURETIC THERAPY: ICD-10-CM

## 2024-02-15 DIAGNOSIS — G47.8 UNREFRESHED BY SLEEP: ICD-10-CM

## 2024-02-15 DIAGNOSIS — E55.9 VITAMIN D DEFICIENCY: ICD-10-CM

## 2024-02-15 DIAGNOSIS — R68.89 COLD INTOLERANCE: ICD-10-CM

## 2024-02-15 DIAGNOSIS — R53.83 OTHER FATIGUE: ICD-10-CM

## 2024-02-15 DIAGNOSIS — R06.83 SNORES: ICD-10-CM

## 2024-02-15 DIAGNOSIS — R53.83 OTHER FATIGUE: Primary | ICD-10-CM

## 2024-02-15 LAB
25(OH)D3 SERPL-MCNC: 28.5 NG/ML (ref 30–100)
ALBUMIN SERPL-MCNC: 4.2 G/DL (ref 3.5–5)
ALBUMIN/GLOB SERPL: 1.3 (ref 0.4–1.6)
ALP SERPL-CCNC: 70 U/L (ref 50–136)
ALT SERPL-CCNC: 13 U/L (ref 12–65)
ANION GAP SERPL CALC-SCNC: 4 MMOL/L (ref 2–11)
AST SERPL-CCNC: 11 U/L (ref 15–37)
BASOPHILS # BLD: 0 K/UL (ref 0–0.2)
BASOPHILS NFR BLD: 1 % (ref 0–2)
BILIRUB SERPL-MCNC: 0.5 MG/DL (ref 0.2–1.1)
BUN SERPL-MCNC: 11 MG/DL (ref 6–23)
CALCIUM SERPL-MCNC: 9.6 MG/DL (ref 8.3–10.4)
CHLORIDE SERPL-SCNC: 114 MMOL/L (ref 103–113)
CO2 SERPL-SCNC: 22 MMOL/L (ref 21–32)
CREAT SERPL-MCNC: 0.7 MG/DL (ref 0.6–1)
DIFFERENTIAL METHOD BLD: ABNORMAL
EOSINOPHIL # BLD: 0.1 K/UL (ref 0–0.8)
EOSINOPHIL NFR BLD: 2 % (ref 0.5–7.8)
ERYTHROCYTE [DISTWIDTH] IN BLOOD BY AUTOMATED COUNT: 11.9 % (ref 11.9–14.6)
GLOBULIN SER CALC-MCNC: 3.3 G/DL (ref 2.8–4.5)
GLUCOSE SERPL-MCNC: 90 MG/DL (ref 65–100)
HCT VFR BLD AUTO: 38.7 % (ref 35.8–46.3)
HGB BLD-MCNC: 12.9 G/DL (ref 11.7–15.4)
IMM GRANULOCYTES # BLD AUTO: 0 K/UL (ref 0–0.5)
IMM GRANULOCYTES NFR BLD AUTO: 0 % (ref 0–5)
LYMPHOCYTES # BLD: 3.1 K/UL (ref 0.5–4.6)
LYMPHOCYTES NFR BLD: 53 % (ref 13–44)
MCH RBC QN AUTO: 31.9 PG (ref 26.1–32.9)
MCHC RBC AUTO-ENTMCNC: 33.3 G/DL (ref 31.4–35)
MCV RBC AUTO: 95.6 FL (ref 82–102)
MONOCYTES # BLD: 0.4 K/UL (ref 0.1–1.3)
MONOCYTES NFR BLD: 7 % (ref 4–12)
NEUTS SEG # BLD: 2.2 K/UL (ref 1.7–8.2)
NEUTS SEG NFR BLD: 37 % (ref 43–78)
NRBC # BLD: 0 K/UL (ref 0–0.2)
PLATELET # BLD AUTO: 289 K/UL (ref 150–450)
PMV BLD AUTO: 8.9 FL (ref 9.4–12.3)
POTASSIUM SERPL-SCNC: 4.2 MMOL/L (ref 3.5–5.1)
PROT SERPL-MCNC: 7.5 G/DL (ref 6.3–8.2)
RBC # BLD AUTO: 4.05 M/UL (ref 4.05–5.2)
SODIUM SERPL-SCNC: 140 MMOL/L (ref 136–146)
TSH W FREE THYROID IF ABNORMAL: 3.04 UIU/ML (ref 0.36–3.74)
WBC # BLD AUTO: 5.8 K/UL (ref 4.3–11.1)

## 2024-02-15 PROCEDURE — 99214 OFFICE O/P EST MOD 30 MIN: CPT | Performed by: NURSE PRACTITIONER

## 2024-02-15 RX ORDER — ERGOCALCIFEROL 1.25 MG/1
50000 CAPSULE ORAL
Qty: 3 CAPSULE | Refills: 4 | Status: SHIPPED | OUTPATIENT
Start: 2024-02-15 | End: 2024-03-08 | Stop reason: SDUPTHER

## 2024-02-15 ASSESSMENT — PATIENT HEALTH QUESTIONNAIRE - PHQ9
SUM OF ALL RESPONSES TO PHQ QUESTIONS 1-9: 15
SUM OF ALL RESPONSES TO PHQ9 QUESTIONS 1 & 2: 4
6. FEELING BAD ABOUT YOURSELF - OR THAT YOU ARE A FAILURE OR HAVE LET YOURSELF OR YOUR FAMILY DOWN: 1
SUM OF ALL RESPONSES TO PHQ QUESTIONS 1-9: 15
4. FEELING TIRED OR HAVING LITTLE ENERGY: 3
9. THOUGHTS THAT YOU WOULD BE BETTER OFF DEAD, OR OF HURTING YOURSELF: 0
9. THOUGHTS THAT YOU WOULD BE BETTER OFF DEAD, OR OF HURTING YOURSELF: NOT AT ALL
8. MOVING OR SPEAKING SO SLOWLY THAT OTHER PEOPLE COULD HAVE NOTICED. OR THE OPPOSITE, BEING SO FIGETY OR RESTLESS THAT YOU HAVE BEEN MOVING AROUND A LOT MORE THAN USUAL: 0
10. IF YOU CHECKED OFF ANY PROBLEMS, HOW DIFFICULT HAVE THESE PROBLEMS MADE IT FOR YOU TO DO YOUR WORK, TAKE CARE OF THINGS AT HOME, OR GET ALONG WITH OTHER PEOPLE: VERY DIFFICULT
1. LITTLE INTEREST OR PLEASURE IN DOING THINGS: 2
5. POOR APPETITE OR OVEREATING: 3
8. MOVING OR SPEAKING SO SLOWLY THAT OTHER PEOPLE COULD HAVE NOTICED. OR THE OPPOSITE - BEING SO FIDGETY OR RESTLESS THAT YOU HAVE BEEN MOVING AROUND A LOT MORE THAN USUAL: NOT AT ALL
2. FEELING DOWN, DEPRESSED OR HOPELESS: 2
3. TROUBLE FALLING OR STAYING ASLEEP: 3
1. LITTLE INTEREST OR PLEASURE IN DOING THINGS: MORE THAN HALF THE DAYS
3. TROUBLE FALLING OR STAYING ASLEEP: NEARLY EVERY DAY
SUM OF ALL RESPONSES TO PHQ QUESTIONS 1-9: 15
6. FEELING BAD ABOUT YOURSELF - OR THAT YOU ARE A FAILURE OR HAVE LET YOURSELF OR YOUR FAMILY DOWN: SEVERAL DAYS
7. TROUBLE CONCENTRATING ON THINGS, SUCH AS READING THE NEWSPAPER OR WATCHING TELEVISION: 1
5. POOR APPETITE OR OVEREATING: NEARLY EVERY DAY
10. IF YOU CHECKED OFF ANY PROBLEMS, HOW DIFFICULT HAVE THESE PROBLEMS MADE IT FOR YOU TO DO YOUR WORK, TAKE CARE OF THINGS AT HOME, OR GET ALONG WITH OTHER PEOPLE: 2
4. FEELING TIRED OR HAVING LITTLE ENERGY: NEARLY EVERY DAY
2. FEELING DOWN, DEPRESSED OR HOPELESS: MORE THAN HALF THE DAYS
7. TROUBLE CONCENTRATING ON THINGS, SUCH AS READING THE NEWSPAPER OR WATCHING TELEVISION: SEVERAL DAYS

## 2024-02-15 NOTE — PROGRESS NOTES
PROGRESS NOTE    SUBJECTIVE:   Charlotte Otoole is a 38 y.o. female seen for a follow up visit for No chief complaint on file.    HPI  Describes:  \"I am super tired.  Unlike before I had a problem falling asleep I would just fall asleep.. Almost as if I stop breathing I woke up.\"      Fell asleep at work and coworker said I was snoring.    Eye exams recent.    Past Medical History:   Diagnosis Date    Abdominal bloating 1/15/2020    Last Assessment & Plan:  Formatting of this note might be different from the original. Main complaint  Hx alternating constipation, diarrhea with new trend towards diarrhea, loose stools with some hematochezia No prior EGD Colonoscopy 2016 with pan diverticulosis and hemorrhoids Will do EGD and colonoscopy to evaluate    Anxiety     Depression     Diverticulitis 2011 2016    Diverticulosis 2011    Fibroadenoma of breast, right     Insomnia     Lower abdominal pain 1/14/2020    Formatting of this note might be different from the original. Added automatically from request for surgery [id number]    Lumbar disc disorder     Mitral valve regurgitation 05/21/2018    1+    MVP (mitral valve prolapse) 05/21/2018    mild anterior leaflet MVP    Syncope     1st occur 2-3 months ag    Vitamin D deficiency 05/29/2018    Yeast vaginitis 10/17/2018        Past Surgical History:   Procedure Laterality Date    BREAST BIOPSY Right 2013    excision fibroadenoma    BREAST SURGERY Right 2014    biopsy    COLONOSCOPY  2016    Had in [place], has seen Dr Lebron    LUMBAR PUNCTURE  12/07/2022       Current Outpatient Medications   Medication Sig Dispense Refill    acetaZOLAMIDE (DIAMOX) 250 MG tablet Take 3 tablets by mouth 2 times daily 180 tablet 2    vitamin D (ERGOCALCIFEROL) 1.25 MG (30285 UT) CAPS capsule Take 1 capsule by mouth every 30 days . 3 capsule 4    valACYclovir (VALTREX) 1 g tablet Take 2 tablets by mouth 2 times daily 90 tablet 3    Erenumab-aooe (AIMOVIG) 140 MG/ML SOAJ Inject 140 mg into the

## 2024-03-08 ENCOUNTER — OFFICE VISIT (OUTPATIENT)
Dept: INTERNAL MEDICINE CLINIC | Facility: CLINIC | Age: 39
End: 2024-03-08
Payer: COMMERCIAL

## 2024-03-08 VITALS
SYSTOLIC BLOOD PRESSURE: 122 MMHG | WEIGHT: 169.4 LBS | HEIGHT: 68 IN | BODY MASS INDEX: 25.67 KG/M2 | DIASTOLIC BLOOD PRESSURE: 88 MMHG

## 2024-03-08 DIAGNOSIS — J34.3 HYPERTROPHY OF NASAL TURBINATES: Primary | ICD-10-CM

## 2024-03-08 DIAGNOSIS — R53.82 CHRONIC FATIGUE: ICD-10-CM

## 2024-03-08 DIAGNOSIS — E55.9 VITAMIN D DEFICIENCY: ICD-10-CM

## 2024-03-08 PROCEDURE — 99213 OFFICE O/P EST LOW 20 MIN: CPT | Performed by: NURSE PRACTITIONER

## 2024-03-08 RX ORDER — FLUTICASONE PROPIONATE 50 MCG
2 SPRAY, SUSPENSION (ML) NASAL
Qty: 16 G | Refills: 5 | Status: SHIPPED | OUTPATIENT
Start: 2024-03-08

## 2024-03-08 RX ORDER — ERGOCALCIFEROL 1.25 MG/1
50000 CAPSULE ORAL
Qty: 6 CAPSULE | Refills: 4 | Status: SHIPPED | OUTPATIENT
Start: 2024-03-08

## 2024-03-08 NOTE — PROGRESS NOTES
PROGRESS NOTE    SUBJECTIVE:   Charlotte Otoole is a 38 y.o. female seen for a follow up visit for   Chief Complaint   Patient presents with    Fatigue     2 wk f/u. Pt states still very fatigue and headaches.        HPI    Headache -- pain / head fullness when laying flat.    Fatigue  Describes:  \"I am super tired.  Unlike before I had a problem falling asleep I would just fall asleep.. Almost as if I stop breathing I woke up.\"      Fell asleep at work and coworker said I was snoring.    Eye exams recent.    Past Medical History:   Diagnosis Date    Abdominal bloating 1/15/2020    Last Assessment & Plan:  Formatting of this note might be different from the original. Main complaint  Hx alternating constipation, diarrhea with new trend towards diarrhea, loose stools with some hematochezia No prior EGD Colonoscopy 2016 with pan diverticulosis and hemorrhoids Will do EGD and colonoscopy to evaluate    Anxiety     Depression     Diverticulitis 2011 2016    Diverticulosis 2011    Fibroadenoma of breast, right     Insomnia     Lower abdominal pain 1/14/2020    Formatting of this note might be different from the original. Added automatically from request for surgery [id number]    Lumbar disc disorder     Mitral valve regurgitation 05/21/2018    1+    MVP (mitral valve prolapse) 05/21/2018    mild anterior leaflet MVP    Syncope     1st occur 2-3 months ag    Vitamin D deficiency 05/29/2018    Yeast vaginitis 10/17/2018        Past Surgical History:   Procedure Laterality Date    BREAST BIOPSY Right 2013    excision fibroadenoma    BREAST SURGERY Right 2014    biopsy    COLONOSCOPY  2016    Had in [place], has seen Dr Lebron    LUMBAR PUNCTURE  12/07/2022       Current Outpatient Medications   Medication Sig Dispense Refill    vitamin D (ERGOCALCIFEROL) 1.25 MG (18608 UT) CAPS capsule Take 1 capsule by mouth every 30 days . 3 capsule 4    acetaZOLAMIDE (DIAMOX) 250 MG tablet Take 3 tablets by mouth 2 times daily 180 tablet 2

## 2024-03-25 ENCOUNTER — HOSPITAL ENCOUNTER (OUTPATIENT)
Dept: LAB | Age: 39
Setting detail: SPECIMEN
Discharge: HOME | End: 2024-03-28

## 2024-03-25 PROCEDURE — 86735 MUMPS ANTIBODY: CPT

## 2024-03-25 PROCEDURE — 86762 RUBELLA ANTIBODY: CPT

## 2024-03-25 PROCEDURE — 86765 RUBEOLA ANTIBODY: CPT

## 2024-03-25 PROCEDURE — 36415 COLL VENOUS BLD VENIPUNCTURE: CPT

## 2024-03-30 PROBLEM — Z91.199 GENERAL PATIENT NONCOMPLIANCE: Status: RESOLVED | Noted: 2019-08-22 | Resolved: 2024-03-30

## 2024-04-15 NOTE — PROGRESS NOTES
[facility] Group Therapy Note      6/22/2023    Location:  [place]        Clients Presents: 21 , 1469       Clients Absent: 1325, 1479       Length of session: 1.5 hours    Group Note: OP    Group Type: Co-Ed    New members were welcomed and introduced. Norms and expectations of group were discussed. Content: Understanding Anger Management and Substance Use         MARCIAL Sagastume  0/24/1180 5:28 PM      Co-Therapist: N/A      [facility] Individual Group Progress Note    Benja Kim  1983 6/22/2023    Notes on Client Progress in [facility] 3 attended group, introduced himself and events leading to arrival; presented check in information: identified anger avoidance, explosive and shame based anger.           MARCIAL Sagastume  1/40/4990 8:54 PM        Co-Therapist: N/A HPI  Charlotte Otoole is a 39 y.o. female seen for follow-up after unprotected intercourse 2 weeks ago.  She has been experiencing external itching.  She states her urine has an odor and is cloudy.  She also has a white vaginal discharge.  Today we did a new swab, routine culture and a urine culture.    Past Medical History, Past Surgical History, Family history, Social History, and Medications were all reviewed with the patient today and updated as necessary.     Current Outpatient Medications   Medication Sig    fluconazole (DIFLUCAN) 150 MG tablet 1 tablet by mouth and repeat in 3 days for yeast infection    triamcinolone (KENALOG) 0.1 % ointment Apply externally 2-3 times daily for rash and itching    fluticasone (FLONASE) 50 MCG/ACT nasal spray 2 sprays by Each Nostril route nightly    vitamin D (ERGOCALCIFEROL) 1.25 MG (98575 UT) CAPS capsule Take 1 capsule by mouth every 14 days .    acetaZOLAMIDE (DIAMOX) 250 MG tablet Take 3 tablets by mouth 2 times daily    valACYclovir (VALTREX) 1 g tablet Take 2 tablets by mouth 2 times daily    Erenumab-aooe (AIMOVIG) 140 MG/ML SOAJ Inject 140 mg into the skin every 30 days    SENEXON-S 8.6-50 MG per tablet TAKE 1 TABLET BY MOUTH 2 (TWO) TIMES A DAY STOP SENNA    acetaminophen (TYLENOL) 500 MG tablet Take 1 tablet by mouth as needed    pantoprazole (PROTONIX) 40 MG tablet Take 1 tablet by mouth daily     No current facility-administered medications for this visit.     No Known Allergies  Past Medical History:   Diagnosis Date    Abdominal bloating 1/15/2020    Last Assessment & Plan:  Formatting of this note might be different from the original. Main complaint  Hx alternating constipation, diarrhea with new trend towards diarrhea, loose stools with some hematochezia No prior EGD Colonoscopy 2016 with pan diverticulosis and hemorrhoids Will do EGD and colonoscopy to evaluate    Anxiety     Depression     Diverticulitis 2011 2016    Diverticulosis 2011    Fibroadenoma

## 2024-04-16 ENCOUNTER — OFFICE VISIT (OUTPATIENT)
Dept: OBGYN CLINIC | Age: 39
End: 2024-04-16
Payer: COMMERCIAL

## 2024-04-16 VITALS
HEIGHT: 68 IN | SYSTOLIC BLOOD PRESSURE: 108 MMHG | WEIGHT: 173 LBS | BODY MASS INDEX: 26.22 KG/M2 | DIASTOLIC BLOOD PRESSURE: 62 MMHG

## 2024-04-16 DIAGNOSIS — R82.90 CLOUDY URINE: ICD-10-CM

## 2024-04-16 DIAGNOSIS — Z20.2 POSSIBLE EXPOSURE TO STD: ICD-10-CM

## 2024-04-16 DIAGNOSIS — N89.8 VAGINAL ITCHING: Primary | ICD-10-CM

## 2024-04-16 DIAGNOSIS — N76.2 ACUTE VULVITIS: ICD-10-CM

## 2024-04-16 DIAGNOSIS — R30.0 BURNING WITH URINATION: ICD-10-CM

## 2024-04-16 DIAGNOSIS — N89.8 VAGINAL DISCHARGE: ICD-10-CM

## 2024-04-16 LAB
HBV SURFACE AG SER QL: NONREACTIVE
HCV AB SER QL: NONREACTIVE
HIV 1+2 AB+HIV1 P24 AG SERPL QL IA: NONREACTIVE
HIV 1/2 RESULT COMMENT: NORMAL

## 2024-04-16 PROCEDURE — 99214 OFFICE O/P EST MOD 30 MIN: CPT | Performed by: OBSTETRICS & GYNECOLOGY

## 2024-04-16 RX ORDER — TRIAMCINOLONE ACETONIDE 1 MG/G
OINTMENT TOPICAL
Qty: 80 G | Refills: 1 | Status: SHIPPED | OUTPATIENT
Start: 2024-04-16

## 2024-04-16 RX ORDER — FLUCONAZOLE 150 MG/1
TABLET ORAL
Qty: 2 TABLET | Refills: 1 | Status: SHIPPED | OUTPATIENT
Start: 2024-04-16

## 2024-04-17 LAB — RPR SER QL: NONREACTIVE

## 2024-04-19 LAB
BACTERIA SPEC CULT: NORMAL
BACTERIA SPEC CULT: NORMAL
SERVICE CMNT-IMP: NORMAL

## 2024-04-20 LAB
A VAGINAE DNA VAG QL NAA+PROBE: NORMAL SCORE
BVAB2 DNA VAG QL NAA+PROBE: NORMAL SCORE
C ALBICANS DNA VAG QL NAA+PROBE: NEGATIVE
C GLABRATA DNA VAG QL NAA+PROBE: NEGATIVE
C TRACH RRNA SPEC QL NAA+PROBE: NEGATIVE
CANDIDA KRUSEI: NEGATIVE
CANDIDA LUSITANIAE, NAA: NEGATIVE
CANDIDA PARAPSILOSIS/TROPICALIS: NEGATIVE
MEGA1 DNA VAG QL NAA+PROBE: NORMAL SCORE
N GONORRHOEA RRNA SPEC QL NAA+PROBE: NEGATIVE
T VAGINALIS RRNA SPEC QL NAA+PROBE: NEGATIVE

## 2024-04-21 LAB
BACTERIA SPEC CULT: ABNORMAL
BACTERIA SPEC CULT: ABNORMAL
SERVICE CMNT-IMP: ABNORMAL

## 2024-04-24 LAB
BACTERIA SPEC CULT: NORMAL
SERVICE CMNT-IMP: NORMAL

## 2024-04-25 ENCOUNTER — PATIENT MESSAGE (OUTPATIENT)
Dept: OBGYN CLINIC | Age: 39
End: 2024-04-25

## 2024-04-25 DIAGNOSIS — B37.9 YEAST INFECTION: Primary | ICD-10-CM

## 2024-04-25 RX ORDER — FLUCONAZOLE 150 MG/1
TABLET ORAL
Qty: 2 TABLET | Refills: 0 | Status: CANCELLED | OUTPATIENT
Start: 2024-04-25

## 2024-04-25 NOTE — TELEPHONE ENCOUNTER
Per Dr. Vazquez    A light growth of yeast was noted on the vaginal culture.  All the STD vaginal testing was negative.  The patient can be treated with Diflucan.

## 2024-05-01 DIAGNOSIS — G93.2 IDIOPATHIC INTRACRANIAL HYPERTENSION: ICD-10-CM

## 2024-05-01 RX ORDER — ACETAZOLAMIDE 250 MG/1
750 TABLET ORAL 2 TIMES DAILY
Qty: 180 TABLET | Refills: 2 | Status: CANCELLED | OUTPATIENT
Start: 2024-05-01

## 2024-05-01 RX ORDER — ACETAZOLAMIDE 250 MG/1
750 TABLET ORAL 2 TIMES DAILY
Qty: 180 TABLET | Refills: 2 | Status: SHIPPED | OUTPATIENT
Start: 2024-05-01

## 2024-05-14 DIAGNOSIS — G93.2 IDIOPATHIC INTRACRANIAL HYPERTENSION: ICD-10-CM

## 2024-05-14 RX ORDER — ACETAZOLAMIDE 250 MG/1
750 TABLET ORAL 2 TIMES DAILY
Qty: 180 TABLET | Refills: 2 | OUTPATIENT
Start: 2024-05-14

## 2024-08-06 ASSESSMENT — ENCOUNTER SYMPTOMS
ALLERGIC/IMMUNOLOGIC NEGATIVE: 1
EYE PAIN: 1
GASTROINTESTINAL NEGATIVE: 1
RESPIRATORY NEGATIVE: 1

## 2024-08-07 ENCOUNTER — TELEPHONE (OUTPATIENT)
Dept: NEUROLOGY | Age: 39
End: 2024-08-07

## 2024-08-07 ENCOUNTER — OFFICE VISIT (OUTPATIENT)
Dept: NEUROLOGY | Age: 39
End: 2024-08-07
Payer: MEDICAID

## 2024-08-07 VITALS
HEIGHT: 68 IN | SYSTOLIC BLOOD PRESSURE: 101 MMHG | BODY MASS INDEX: 25.76 KG/M2 | RESPIRATION RATE: 16 BRPM | DIASTOLIC BLOOD PRESSURE: 58 MMHG | WEIGHT: 170 LBS | OXYGEN SATURATION: 99 % | HEART RATE: 66 BPM

## 2024-08-07 DIAGNOSIS — G93.2 IDIOPATHIC INTRACRANIAL HYPERTENSION: ICD-10-CM

## 2024-08-07 DIAGNOSIS — G43.E01 CHRONIC MIGRAINE WITH AURA AND WITH STATUS MIGRAINOSUS, NOT INTRACTABLE: Primary | ICD-10-CM

## 2024-08-07 DIAGNOSIS — F51.04 PSYCHOPHYSIOLOGICAL INSOMNIA: ICD-10-CM

## 2024-08-07 LAB
ALBUMIN SERPL-MCNC: 3.9 G/DL (ref 3.5–5)
ALBUMIN/GLOB SERPL: 1.2 (ref 1–1.9)
ALP SERPL-CCNC: 60 U/L (ref 35–104)
ALT SERPL-CCNC: 10 U/L (ref 12–65)
ANION GAP SERPL CALC-SCNC: 11 MMOL/L (ref 9–18)
AST SERPL-CCNC: 14 U/L (ref 15–37)
BASOPHILS # BLD: 0.1 K/UL (ref 0–0.2)
BASOPHILS NFR BLD: 1 % (ref 0–2)
BILIRUB SERPL-MCNC: 0.6 MG/DL (ref 0–1.2)
BUN SERPL-MCNC: 12 MG/DL (ref 6–23)
CALCIUM SERPL-MCNC: 8.9 MG/DL (ref 8.8–10.2)
CHLORIDE SERPL-SCNC: 111 MMOL/L (ref 98–107)
CO2 SERPL-SCNC: 17 MMOL/L (ref 20–28)
CREAT SERPL-MCNC: 0.9 MG/DL (ref 0.6–1.1)
DIFFERENTIAL METHOD BLD: ABNORMAL
EOSINOPHIL # BLD: 0.1 K/UL (ref 0–0.8)
EOSINOPHIL NFR BLD: 2 % (ref 0.5–7.8)
ERYTHROCYTE [DISTWIDTH] IN BLOOD BY AUTOMATED COUNT: 12.6 % (ref 11.9–14.6)
GLOBULIN SER CALC-MCNC: 3.3 G/DL (ref 2.3–3.5)
GLUCOSE SERPL-MCNC: 62 MG/DL (ref 70–99)
HCT VFR BLD AUTO: 42.1 % (ref 35.8–46.3)
HGB BLD-MCNC: 13.4 G/DL (ref 11.7–15.4)
IMM GRANULOCYTES # BLD AUTO: 0 K/UL (ref 0–0.5)
IMM GRANULOCYTES NFR BLD AUTO: 0 % (ref 0–5)
LYMPHOCYTES # BLD: 2.4 K/UL (ref 0.5–4.6)
LYMPHOCYTES NFR BLD: 48 % (ref 13–44)
MCH RBC QN AUTO: 32.5 PG (ref 26.1–32.9)
MCHC RBC AUTO-ENTMCNC: 31.8 G/DL (ref 31.4–35)
MCV RBC AUTO: 102.2 FL (ref 82–102)
MONOCYTES # BLD: 0.4 K/UL (ref 0.1–1.3)
MONOCYTES NFR BLD: 7 % (ref 4–12)
NEUTS SEG # BLD: 2.1 K/UL (ref 1.7–8.2)
NEUTS SEG NFR BLD: 42 % (ref 43–78)
NRBC # BLD: 0 K/UL (ref 0–0.2)
PLATELET # BLD AUTO: 311 K/UL (ref 150–450)
PMV BLD AUTO: 8.9 FL (ref 9.4–12.3)
POTASSIUM SERPL-SCNC: 3.9 MMOL/L (ref 3.5–5.1)
PROT SERPL-MCNC: 7.3 G/DL (ref 6.3–8.2)
RBC # BLD AUTO: 4.12 M/UL (ref 4.05–5.2)
SODIUM SERPL-SCNC: 139 MMOL/L (ref 136–145)
WBC # BLD AUTO: 5 K/UL (ref 4.3–11.1)

## 2024-08-07 PROCEDURE — 99214 OFFICE O/P EST MOD 30 MIN: CPT | Performed by: NURSE PRACTITIONER

## 2024-08-07 RX ORDER — ACETAZOLAMIDE 250 MG/1
750 TABLET ORAL 2 TIMES DAILY
Qty: 180 TABLET | Refills: 2 | Status: SHIPPED | OUTPATIENT
Start: 2024-08-07

## 2024-08-07 RX ORDER — ACETAZOLAMIDE 250 MG/1
750 TABLET ORAL 2 TIMES DAILY
Qty: 180 TABLET | Refills: 2 | Status: SHIPPED | OUTPATIENT
Start: 2024-08-07 | End: 2024-08-07

## 2024-08-07 RX ORDER — TRAZODONE HYDROCHLORIDE 50 MG/1
50 TABLET ORAL NIGHTLY PRN
Qty: 30 TABLET | Refills: 5 | Status: SHIPPED | OUTPATIENT
Start: 2024-08-07 | End: 2024-08-07

## 2024-08-07 RX ORDER — TRAZODONE HYDROCHLORIDE 50 MG/1
50 TABLET ORAL NIGHTLY PRN
Qty: 30 TABLET | Refills: 5 | Status: SHIPPED | OUTPATIENT
Start: 2024-08-07

## 2024-08-07 ASSESSMENT — PATIENT HEALTH QUESTIONNAIRE - PHQ9
3. TROUBLE FALLING OR STAYING ASLEEP: NEARLY EVERY DAY
1. LITTLE INTEREST OR PLEASURE IN DOING THINGS: NEARLY EVERY DAY
SUM OF ALL RESPONSES TO PHQ QUESTIONS 1-9: 13
2. FEELING DOWN, DEPRESSED OR HOPELESS: NEARLY EVERY DAY
10. IF YOU CHECKED OFF ANY PROBLEMS, HOW DIFFICULT HAVE THESE PROBLEMS MADE IT FOR YOU TO DO YOUR WORK, TAKE CARE OF THINGS AT HOME, OR GET ALONG WITH OTHER PEOPLE: SOMEWHAT DIFFICULT
SUM OF ALL RESPONSES TO PHQ QUESTIONS 1-9: 13
6. FEELING BAD ABOUT YOURSELF - OR THAT YOU ARE A FAILURE OR HAVE LET YOURSELF OR YOUR FAMILY DOWN: SEVERAL DAYS
4. FEELING TIRED OR HAVING LITTLE ENERGY: SEVERAL DAYS
SUM OF ALL RESPONSES TO PHQ QUESTIONS 1-9: 13
5. POOR APPETITE OR OVEREATING: NOT AT ALL
7. TROUBLE CONCENTRATING ON THINGS, SUCH AS READING THE NEWSPAPER OR WATCHING TELEVISION: SEVERAL DAYS
9. THOUGHTS THAT YOU WOULD BE BETTER OFF DEAD, OR OF HURTING YOURSELF: NOT AT ALL
SUM OF ALL RESPONSES TO PHQ9 QUESTIONS 1 & 2: 6
SUM OF ALL RESPONSES TO PHQ QUESTIONS 1-9: 13
8. MOVING OR SPEAKING SO SLOWLY THAT OTHER PEOPLE COULD HAVE NOTICED. OR THE OPPOSITE, BEING SO FIGETY OR RESTLESS THAT YOU HAVE BEEN MOVING AROUND A LOT MORE THAN USUAL: SEVERAL DAYS

## 2024-08-07 NOTE — TELEPHONE ENCOUNTER
D request for records from [employer] regarding PA for Aimovig 140.  Faxed then to [phone], confirmation received

## 2024-08-07 NOTE — PATIENT INSTRUCTIONS
Headache Education:   Instructed the patient on over-the-counter headache management medications including: CoQ10, magnesium oxide, riboflavin and butterbur.  To avoid a pain medication overuse headache trying not to take pain medicines more than 3 doses a week.   Avoid use of Fioricet or opioids to treat headaches as this can increase risk for rebound headaches.   To help relieve headache symptoms without taking pain medicine lie down under darkroom and drink glass of water.  Consider lifestyle modification including good sleep hygiene, routine medial schedules, regular exercise and managing triggers.  Keep a headache diary  to reveal triggers and possible patterns.  Triggers may be: Food, stress, perfumes, alcohol, or even chocolate.  Drink plenty of water and try to get 8 hours of sleep each night to reduce risk factors that may cause headaches.      Samples of Nurtec ODT 75 mg and Ubrelvy 50 mg provided to patient. Advised not to take medication on same day and to update office on efficacy.   Instructions:  Nurtec ODT 75 mg daily as needed for migraine abortive therapy. Not to exceed 75mg/24 hours.   Ubrelvy 50 mg daily as needed for migraine abortive therapy. May repeat for 1 dose in 2 hours if needed. Not to exceed 200mg/24 hours.

## 2024-08-07 NOTE — PROGRESS NOTES
[facility] [address], [address]  [place] [address]  [phone]      Chief Complaint   Patient presents with    Headache       Charlotte Otoole is a 39 y.o. female who presents  for follow up for migraines and IIHTN.      Interval history:  She is here today by herself.  Endorses headache today. Located on right luis a cephalic and radiates diffusely and retro-orbitally. Noted her headaches can also be on left. Associated with photophobia, phonophobia, nausea, cervialgia. Duration for the past 14 days. Current severity 5/10. She has been taking OTC tylenol 3 times daily for past 3 days with minimal relief. She is currently taking Aimovig 140 mg monthly injections, however noted last injection was in June 2024. She was unable to get medication due to not being on insurance formulary. She has recently started working at [facility], and has new insurance.     Hx of IIHTN- she has been seen by ophthalmology, noted improved vision and decreased pressure. She is currently on Diamox 750 mg twice daily.  Tolerating medication. Followed by ophthalmology.     She was referred to bariatric, however did not meet criteria for weight loss program. She was also evaluated by [facility], she declined  shunting. She was referred for cerebral angiogram and venogram to evaluate for possible stenting, however procedure was not completed.  Patient stated procedure canceled due to improvement of papilledema.     She endorses difficulty sleeping and maintaining sleep. She has hx of insomnia, previously tried melatonin and amitriptyline. She was unable to tolerate amitriptyline in the past due to increased confusion and hallucinations. Melatonin was not efficacious. She has also tried NyQuil Z without improvement. She is currently working night shift.     Past Medical History:   Diagnosis Date    Abdominal bloating 1/15/2020    Last Assessment & Plan:  Formatting of this note might be 
Detail Level: Zone
Detail Level: Detailed

## 2024-08-08 ENCOUNTER — TELEPHONE (OUTPATIENT)
Dept: NEUROLOGY | Age: 39
End: 2024-08-08

## 2024-08-08 NOTE — TELEPHONE ENCOUNTER
PA for Aimovig approved. The request has been approved. The authorization is effective from 08/07/2024 to 02/06/2025, as long as the member is enrolled in their current health plan. The request was approved as submitted. This request has been approved with a quantity limit of 1 mL per 30 days.

## 2024-08-30 ENCOUNTER — OFFICE VISIT (OUTPATIENT)
Dept: INTERNAL MEDICINE CLINIC | Facility: CLINIC | Age: 39
End: 2024-08-30
Payer: MEDICAID

## 2024-08-30 VITALS
DIASTOLIC BLOOD PRESSURE: 58 MMHG | HEART RATE: 67 BPM | BODY MASS INDEX: 26 KG/M2 | OXYGEN SATURATION: 96 % | WEIGHT: 171 LBS | SYSTOLIC BLOOD PRESSURE: 112 MMHG

## 2024-08-30 DIAGNOSIS — E55.9 VITAMIN D DEFICIENCY: ICD-10-CM

## 2024-08-30 DIAGNOSIS — Z11.3 SCREEN FOR STD (SEXUALLY TRANSMITTED DISEASE): ICD-10-CM

## 2024-08-30 DIAGNOSIS — R82.90 BAD ODOR OF URINE: ICD-10-CM

## 2024-08-30 DIAGNOSIS — F32.2 CURRENT SEVERE EPISODE OF MAJOR DEPRESSIVE DISORDER WITHOUT PSYCHOTIC FEATURES WITHOUT PRIOR EPISODE (HCC): ICD-10-CM

## 2024-08-30 DIAGNOSIS — R82.90 BAD ODOR OF URINE: Primary | ICD-10-CM

## 2024-08-30 LAB
25(OH)D3 SERPL-MCNC: 25 NG/ML (ref 30–100)
APPEARANCE UR: CLEAR
BACTERIA URNS QL MICRO: 0 /HPF
BILIRUB UR QL: NEGATIVE
CASTS URNS QL MICRO: 0 /LPF
COLOR UR: NORMAL
CRYSTALS URNS QL MICRO: NORMAL /LPF
EPI CELLS #/AREA URNS HPF: NORMAL /HPF
GLUCOSE UR STRIP.AUTO-MCNC: NEGATIVE MG/DL
HGB UR QL STRIP: NEGATIVE
HIV 1+2 AB+HIV1 P24 AG SERPL QL IA: NONREACTIVE
HIV 1/2 RESULT COMMENT: NORMAL
KETONES UR QL STRIP.AUTO: NEGATIVE MG/DL
LEUKOCYTE ESTERASE UR QL STRIP.AUTO: NEGATIVE
MUCOUS THREADS URNS QL MICRO: 0 /LPF
NITRITE UR QL STRIP.AUTO: NEGATIVE
OTHER OBSERVATIONS: NORMAL
PH UR STRIP: 5.5 (ref 5–9)
PROT UR STRIP-MCNC: NEGATIVE MG/DL
RBC #/AREA URNS HPF: NORMAL /HPF
SP GR UR REFRACTOMETRY: 1.02 (ref 1–1.02)
URINE CULTURE IF INDICATED: NORMAL
UROBILINOGEN UR QL STRIP.AUTO: 0.2 EU/DL (ref 0.2–1)
WBC URNS QL MICRO: NORMAL /HPF

## 2024-08-30 PROCEDURE — 99214 OFFICE O/P EST MOD 30 MIN: CPT | Performed by: NURSE PRACTITIONER

## 2024-08-30 SDOH — ECONOMIC STABILITY: INCOME INSECURITY: HOW HARD IS IT FOR YOU TO PAY FOR THE VERY BASICS LIKE FOOD, HOUSING, MEDICAL CARE, AND HEATING?: SOMEWHAT HARD

## 2024-08-30 SDOH — ECONOMIC STABILITY: FOOD INSECURITY: WITHIN THE PAST 12 MONTHS, THE FOOD YOU BOUGHT JUST DIDN'T LAST AND YOU DIDN'T HAVE MONEY TO GET MORE.: OFTEN TRUE

## 2024-08-30 SDOH — ECONOMIC STABILITY: FOOD INSECURITY: WITHIN THE PAST 12 MONTHS, YOU WORRIED THAT YOUR FOOD WOULD RUN OUT BEFORE YOU GOT MONEY TO BUY MORE.: OFTEN TRUE

## 2024-08-30 ASSESSMENT — COLUMBIA-SUICIDE SEVERITY RATING SCALE - C-SSRS
2. HAVE YOU ACTUALLY HAD ANY THOUGHTS OF KILLING YOURSELF?: NO
6. HAVE YOU EVER DONE ANYTHING, STARTED TO DO ANYTHING, OR PREPARED TO DO ANYTHING TO END YOUR LIFE?: NO
1. WITHIN THE PAST MONTH, HAVE YOU WISHED YOU WERE DEAD OR WISHED YOU COULD GO TO SLEEP AND NOT WAKE UP?: NO

## 2024-08-30 ASSESSMENT — PATIENT HEALTH QUESTIONNAIRE - PHQ9
4. FEELING TIRED OR HAVING LITTLE ENERGY: NEARLY EVERY DAY
SUM OF ALL RESPONSES TO PHQ9 QUESTIONS 1 & 2: 2
8. MOVING OR SPEAKING SO SLOWLY THAT OTHER PEOPLE COULD HAVE NOTICED. OR THE OPPOSITE, BEING SO FIGETY OR RESTLESS THAT YOU HAVE BEEN MOVING AROUND A LOT MORE THAN USUAL: MORE THAN HALF THE DAYS
SUM OF ALL RESPONSES TO PHQ QUESTIONS 1-9: 13
1. LITTLE INTEREST OR PLEASURE IN DOING THINGS: SEVERAL DAYS
SUM OF ALL RESPONSES TO PHQ QUESTIONS 1-9: 13
10. IF YOU CHECKED OFF ANY PROBLEMS, HOW DIFFICULT HAVE THESE PROBLEMS MADE IT FOR YOU TO DO YOUR WORK, TAKE CARE OF THINGS AT HOME, OR GET ALONG WITH OTHER PEOPLE: VERY DIFFICULT
3. TROUBLE FALLING OR STAYING ASLEEP: NEARLY EVERY DAY
SUM OF ALL RESPONSES TO PHQ QUESTIONS 1-9: 13
2. FEELING DOWN, DEPRESSED OR HOPELESS: SEVERAL DAYS
SUM OF ALL RESPONSES TO PHQ QUESTIONS 1-9: 2
7. TROUBLE CONCENTRATING ON THINGS, SUCH AS READING THE NEWSPAPER OR WATCHING TELEVISION: SEVERAL DAYS
2. FEELING DOWN, DEPRESSED OR HOPELESS: SEVERAL DAYS
SUM OF ALL RESPONSES TO PHQ QUESTIONS 1-9: 2
9. THOUGHTS THAT YOU WOULD BE BETTER OFF DEAD, OR OF HURTING YOURSELF: NOT AT ALL
5. POOR APPETITE OR OVEREATING: SEVERAL DAYS
1. LITTLE INTEREST OR PLEASURE IN DOING THINGS: SEVERAL DAYS
SUM OF ALL RESPONSES TO PHQ9 QUESTIONS 1 & 2: 2
SUM OF ALL RESPONSES TO PHQ QUESTIONS 1-9: 2
SUM OF ALL RESPONSES TO PHQ QUESTIONS 1-9: 2
6. FEELING BAD ABOUT YOURSELF - OR THAT YOU ARE A FAILURE OR HAVE LET YOURSELF OR YOUR FAMILY DOWN: SEVERAL DAYS
SUM OF ALL RESPONSES TO PHQ QUESTIONS 1-9: 13

## 2024-09-10 RX ORDER — ERGOCALCIFEROL 1.25 MG/1
50000 CAPSULE, LIQUID FILLED ORAL WEEKLY
Qty: 12 CAPSULE | Refills: 0 | Status: SHIPPED | OUTPATIENT
Start: 2024-09-10

## 2024-09-10 ASSESSMENT — ENCOUNTER SYMPTOMS
SHORTNESS OF BREATH: 0
CHEST TIGHTNESS: 0
WHEEZING: 0
COLOR CHANGE: 0
COUGH: 0
ABDOMINAL PAIN: 0

## 2024-10-11 ENCOUNTER — TELEPHONE (OUTPATIENT)
Dept: INTERNAL MEDICINE CLINIC | Facility: CLINIC | Age: 39
End: 2024-10-11

## 2024-10-11 NOTE — TELEPHONE ENCOUNTER
Call from Gwen with [facility] to advise that patient was seen by Dr. William Hartman 10/10/24 who wanted to report to Shirley that patient was experiencing \"significant depression and wants to start therapy\"

## 2024-11-08 ENCOUNTER — OFFICE VISIT (OUTPATIENT)
Dept: NEUROLOGY | Age: 39
End: 2024-11-08
Payer: COMMERCIAL

## 2024-11-08 VITALS
HEIGHT: 68 IN | WEIGHT: 170 LBS | OXYGEN SATURATION: 100 % | SYSTOLIC BLOOD PRESSURE: 113 MMHG | BODY MASS INDEX: 25.76 KG/M2 | HEART RATE: 68 BPM | DIASTOLIC BLOOD PRESSURE: 68 MMHG

## 2024-11-08 DIAGNOSIS — G93.2 IDIOPATHIC INTRACRANIAL HYPERTENSION: ICD-10-CM

## 2024-11-08 DIAGNOSIS — F51.04 PSYCHOPHYSIOLOGICAL INSOMNIA: ICD-10-CM

## 2024-11-08 DIAGNOSIS — G43.E01 CHRONIC MIGRAINE WITH AURA AND WITH STATUS MIGRAINOSUS, NOT INTRACTABLE: ICD-10-CM

## 2024-11-08 DIAGNOSIS — H47.10 PAPILLEDEMA, BOTH EYES: Primary | ICD-10-CM

## 2024-11-08 DIAGNOSIS — G47.26 SHIFT WORK SLEEP DISORDER: ICD-10-CM

## 2024-11-08 PROCEDURE — 99214 OFFICE O/P EST MOD 30 MIN: CPT | Performed by: NURSE PRACTITIONER

## 2024-11-08 RX ORDER — TRAZODONE HYDROCHLORIDE 50 MG/1
50 TABLET, FILM COATED ORAL NIGHTLY PRN
Qty: 90 TABLET | Refills: 3 | Status: SHIPPED | OUTPATIENT
Start: 2024-11-08

## 2024-11-08 RX ORDER — ACETAZOLAMIDE 250 MG/1
750 TABLET ORAL 2 TIMES DAILY
Qty: 540 TABLET | Refills: 3 | Status: SHIPPED | OUTPATIENT
Start: 2024-11-08

## 2024-11-08 ASSESSMENT — PATIENT HEALTH QUESTIONNAIRE - PHQ9
2. FEELING DOWN, DEPRESSED OR HOPELESS: NOT AT ALL
SUM OF ALL RESPONSES TO PHQ QUESTIONS 1-9: 0
SUM OF ALL RESPONSES TO PHQ9 QUESTIONS 1 & 2: 0
SUM OF ALL RESPONSES TO PHQ QUESTIONS 1-9: 0
1. LITTLE INTEREST OR PLEASURE IN DOING THINGS: NOT AT ALL
SUM OF ALL RESPONSES TO PHQ QUESTIONS 1-9: 0
SUM OF ALL RESPONSES TO PHQ QUESTIONS 1-9: 0

## 2024-11-08 ASSESSMENT — ENCOUNTER SYMPTOMS
ALLERGIC/IMMUNOLOGIC NEGATIVE: 1
EYE PAIN: 1
GASTROINTESTINAL NEGATIVE: 1
RESPIRATORY NEGATIVE: 1

## 2024-11-08 NOTE — PROGRESS NOTES
[facility] [address], [address]  [place] [address]  [phone]      Chief Complaint   Patient presents with    Follow-up       Charlotte Otoole is a 39 y.o. female who presents  for follow up for migraines and IIHTN.      Interval history:  She is here today by herself.  Denies headache today. Current frequency ~3-4 mth. She has been well managed on Aimovig 140 mg monthly injections. On previous visit, samples of nurtec provided and noted she had good relief.      Hx of IIHTN- she has been seen by ophthalmology, denies changes in vision, pain with ocular movements, or color desaturation. She is currently on Diamox 750 mg twice daily.  Tolerating medication. .     History of shiftwork sleep disorder-improved with trazodone prn.  Medication tolerated without side effects.  Denies suicidal ideations    Past Medical History:   Diagnosis Date    Abdominal bloating 1/15/2020    Last Assessment & Plan:  Formatting of this note might be different from the original. Main complaint  Hx alternating constipation, diarrhea with new trend towards diarrhea, loose stools with some hematochezia No prior EGD Colonoscopy 2016 with pan diverticulosis and hemorrhoids Will do EGD and colonoscopy to evaluate    Anxiety     Depression     Diverticulitis 2011    2016    Diverticulosis 2011    Fibroadenoma of breast, right     Insomnia     Lower abdominal pain 1/14/2020    Formatting of this note might be different from the original. Added automatically from request for surgery [id number]    Lumbar disc disorder     Mitral valve regurgitation 05/21/2018    1+    MVP (mitral valve prolapse) 05/21/2018    mild anterior leaflet MVP    Syncope     1st occur 2-3 months ag    Vitamin D deficiency 05/29/2018    Yeast vaginitis 10/17/2018       Past Surgical History:   Procedure Laterality Date    BREAST BIOPSY Right 2013    excision fibroadenoma    BREAST SURGERY Right 2014    biopsy    COLONOSCOPY  2016    Had in

## 2025-05-01 ENCOUNTER — TELEPHONE (OUTPATIENT)
Dept: NEUROLOGY | Age: 40
End: 2025-05-01

## 2025-06-05 NOTE — PROGRESS NOTES
tenderness or lesion.       Urethra: No urethral pain or urethral lesion.      Vagina: Normal.      Cervix: Normal.      Uterus: Normal. Tender. Not enlarged (minimally).       Adnexa: Right adnexa normal and left adnexa normal.        Right: No mass.          Left: No mass.        Rectum: Normal anal tone.   Musculoskeletal:         General: Normal range of motion.      Cervical back: Normal range of motion and neck supple.   Lymphadenopathy:      Upper Body:      Right upper body: No axillary adenopathy.      Left upper body: No axillary adenopathy.   Skin:     General: Skin is warm and dry.   Neurological:      General: No focal deficit present.      Mental Status: She is alert and oriented to person, place, and time.   Psychiatric:         Mood and Affect: Mood normal.         Behavior: Behavior normal.         Thought Content: Thought content normal.         Judgment: Judgment normal.          Assesment/plan: Charlotte was seen today for Annual Exam       Charlotte was seen today for annual exam.    Diagnoses and all orders for this visit:    Well woman exam with routine gynecological exam  -     PAP IG, CT-NG-TV, rfx Aptima HPV ASCUS ([id number]); Future  -     PAP IG, CT-NG-TV, rfx Aptima HPV ASCUS ([id number])    Cervical cancer screening  -     PAP IG, CT-NG-TV, rfx Aptima HPV ASCUS ([id number]); Future  -     PAP IG, CT-NG-TV, rfx Aptima HPV ASCUS ([id number])    Screening examination for STD (sexually transmitted disease)  -     PAP IG, CT-NG-TV, rfx Aptima HPV ASCUS ([id number]); Future  -     HIV 1/2 Ag/Ab, 4TH Generation,W Rflx Confirm; Future  -     Hepatitis C Antibody; Future  -     Hepatitis B Surface Antigen; Future  -     T Pallidum Screen W/Reflex; Future  -     PAP IG, CT-NG-TV, rfx Aptima HPV ASCUS ([id number])        Return in about 4 weeks (around 7/7/2025) for US + Recheck Appt.

## 2025-06-09 ENCOUNTER — OFFICE VISIT (OUTPATIENT)
Dept: OBGYN CLINIC | Age: 40
End: 2025-06-09
Payer: COMMERCIAL

## 2025-06-09 VITALS
WEIGHT: 187 LBS | DIASTOLIC BLOOD PRESSURE: 70 MMHG | HEIGHT: 68 IN | BODY MASS INDEX: 28.34 KG/M2 | SYSTOLIC BLOOD PRESSURE: 118 MMHG

## 2025-06-09 DIAGNOSIS — Z12.4 CERVICAL CANCER SCREENING: ICD-10-CM

## 2025-06-09 DIAGNOSIS — Z11.3 SCREENING EXAMINATION FOR STD (SEXUALLY TRANSMITTED DISEASE): ICD-10-CM

## 2025-06-09 DIAGNOSIS — Z01.419 WELL WOMAN EXAM WITH ROUTINE GYNECOLOGICAL EXAM: Primary | ICD-10-CM

## 2025-06-09 LAB
HBV SURFACE AG SER QL: NONREACTIVE
HCV AB SER QL: NONREACTIVE
HIV 1+2 AB+HIV1 P24 AG SERPL QL IA: NONREACTIVE
HIV 1/2 RESULT COMMENT: NORMAL
T PALLIDUM AB SER QL IA: NONREACTIVE

## 2025-06-09 PROCEDURE — 99395 PREV VISIT EST AGE 18-39: CPT | Performed by: OBSTETRICS & GYNECOLOGY

## 2025-06-09 PROCEDURE — 99459 PELVIC EXAMINATION: CPT | Performed by: OBSTETRICS & GYNECOLOGY

## 2025-06-09 SDOH — ECONOMIC STABILITY: FOOD INSECURITY: WITHIN THE PAST 12 MONTHS, YOU WORRIED THAT YOUR FOOD WOULD RUN OUT BEFORE YOU GOT MONEY TO BUY MORE.: NEVER TRUE

## 2025-06-09 SDOH — ECONOMIC STABILITY: FOOD INSECURITY: WITHIN THE PAST 12 MONTHS, THE FOOD YOU BOUGHT JUST DIDN'T LAST AND YOU DIDN'T HAVE MONEY TO GET MORE.: NEVER TRUE

## 2025-06-09 ASSESSMENT — ENCOUNTER SYMPTOMS
COUGH: 0
ALLERGIC/IMMUNOLOGIC NEGATIVE: 1
SHORTNESS OF BREATH: 0
EYES NEGATIVE: 1
RESPIRATORY NEGATIVE: 1
BLOOD IN STOOL: 0
GASTROINTESTINAL NEGATIVE: 1
ABDOMINAL PAIN: 0

## 2025-06-09 ASSESSMENT — PATIENT HEALTH QUESTIONNAIRE - PHQ9
3. TROUBLE FALLING OR STAYING ASLEEP: NEARLY EVERY DAY
4. FEELING TIRED OR HAVING LITTLE ENERGY: MORE THAN HALF THE DAYS
6. FEELING BAD ABOUT YOURSELF - OR THAT YOU ARE A FAILURE OR HAVE LET YOURSELF OR YOUR FAMILY DOWN: MORE THAN HALF THE DAYS
7. TROUBLE CONCENTRATING ON THINGS, SUCH AS READING THE NEWSPAPER OR WATCHING TELEVISION: SEVERAL DAYS
SUM OF ALL RESPONSES TO PHQ QUESTIONS 1-9: 16
9. THOUGHTS THAT YOU WOULD BE BETTER OFF DEAD, OR OF HURTING YOURSELF: SEVERAL DAYS
SUM OF ALL RESPONSES TO PHQ QUESTIONS 1-9: 16
2. FEELING DOWN, DEPRESSED OR HOPELESS: MORE THAN HALF THE DAYS
SUM OF ALL RESPONSES TO PHQ QUESTIONS 1-9: 15
5. POOR APPETITE OR OVEREATING: MORE THAN HALF THE DAYS
1. LITTLE INTEREST OR PLEASURE IN DOING THINGS: NEARLY EVERY DAY
10. IF YOU CHECKED OFF ANY PROBLEMS, HOW DIFFICULT HAVE THESE PROBLEMS MADE IT FOR YOU TO DO YOUR WORK, TAKE CARE OF THINGS AT HOME, OR GET ALONG WITH OTHER PEOPLE: SOMEWHAT DIFFICULT
SUM OF ALL RESPONSES TO PHQ QUESTIONS 1-9: 16
8. MOVING OR SPEAKING SO SLOWLY THAT OTHER PEOPLE COULD HAVE NOTICED. OR THE OPPOSITE, BEING SO FIGETY OR RESTLESS THAT YOU HAVE BEEN MOVING AROUND A LOT MORE THAN USUAL: NOT AT ALL

## 2025-06-09 ASSESSMENT — COLUMBIA-SUICIDE SEVERITY RATING SCALE - C-SSRS
1. WITHIN THE PAST MONTH, HAVE YOU WISHED YOU WERE DEAD OR WISHED YOU COULD GO TO SLEEP AND NOT WAKE UP?: NO
2. HAVE YOU ACTUALLY HAD ANY THOUGHTS OF KILLING YOURSELF?: NO
6. HAVE YOU EVER DONE ANYTHING, STARTED TO DO ANYTHING, OR PREPARED TO DO ANYTHING TO END YOUR LIFE?: NO

## 2025-06-12 ENCOUNTER — RESULTS FOLLOW-UP (OUTPATIENT)
Dept: OBGYN CLINIC | Age: 40
End: 2025-06-12

## 2025-06-12 LAB
C TRACH RRNA CVX QL NAA+PROBE: NEGATIVE
COLLECTION METHOD: NORMAL
CYTOLOGIST CVX/VAG CYTO: NORMAL
CYTOLOGY CVX/VAG DOC THIN PREP: NORMAL
DATE OF LMP: NORMAL
HPV REFLEX: NORMAL
Lab: NORMAL
N GONORRHOEA RRNA CVX QL NAA+PROBE: NEGATIVE
OTHER PT INFO: NORMAL
PAP SOURCE: NORMAL
PATH REPORT.FINAL DX SPEC: NORMAL
PREV CYTO INFO: NEGATIVE
PREV TREATMENT: NORMAL
STAT OF ADQ CVX/VAG CYTO-IMP: NORMAL
T VAGINALIS RRNA SPEC QL NAA+PROBE: NEGATIVE

## 2025-07-14 DIAGNOSIS — G93.2 IDIOPATHIC INTRACRANIAL HYPERTENSION: ICD-10-CM

## 2025-07-14 DIAGNOSIS — G43.E01 CHRONIC MIGRAINE WITH AURA AND WITH STATUS MIGRAINOSUS, NOT INTRACTABLE: ICD-10-CM

## 2025-07-28 ENCOUNTER — HOSPITAL ENCOUNTER (OUTPATIENT)
Dept: NUTRITION | Age: 40
Discharge: HOME | End: 2025-07-31
Payer: COMMERCIAL

## 2025-07-28 PROCEDURE — 97802 MEDICAL NUTRITION INDIV IN: CPT

## 2025-07-28 NOTE — PROGRESS NOTES
Scott Haji, MS RD LD, [facility]  Outpatient Registered Dietitian  [facility]  Phone: [phone]  Fax: [phone]    ASSESSMENT  Pt is a 40 y.o. female presenting today for preventive nutrition counseling focused on obesity prevention and reduction of related chronic disease risk with the following diagnosis (es): Dietary counseling and surveillance [Z71.3]   PMH includes chronic migraines, vitamin D deficiency, Idiopathic intracranial hypertension, IBS with constipation, diverticulitis of colon, depression, anxiety.     Labs: reviewed, 2024 labs reviewed.   Meds: trazadon for sleep. \"May need to up the dose\";     Pt Stated Nutrition Goals Today:   Weight reduction   Eating behaviors and factors impacting food choices:   -Stress eating  -Diet culture  -Poor snack choices  -Late night eating    Weight/Diet History:   Notes significant 20 pounds weight gain over the past 4 months. Reflects on life changes including 19 yr old son moving in with her and change in job from night shift to day shift.    Dietary history reveals unstructured meals, irregular meal times, diet high in refined/ high glycemic carbohydrates.  Preferences include ultra processed foods- ice cream/ cheetos particularly consumed after dinner.     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     Initial Diet Recall:   B: fruit smoothie: 2 cups berries + 1/2 cup OJ, + almond milk  L: hannah's salad with ranch, 1 bottle juice  D: PF changs noodles, dirk drink  Snacks: cheetos/ trail mix/ ice cream    Eating pattern appears excessive in refined carbohydrate, energy and inadequate in protein, fiber, and vegetables.    Lifestyle/Family Influence/Sleep/Support:   Prefers [employer] flavors, works full time as and RN, 18 yo son moved in 4 months ago.   Reflects on poor sleep quality.     Movement: on her feet at work, enjoys hiking.  Stage of Change: Contemplation.    Anthropometrics:   Estimated body mass index is 28.43 kg/m² as

## 2025-08-18 RX ORDER — GALCANEZUMAB 120 MG/ML
INJECTION, SOLUTION SUBCUTANEOUS
Refills: 0 | OUTPATIENT
Start: 2025-08-18

## 2025-08-18 RX ORDER — FREMANEZUMAB-VFRM 225 MG/1.5ML
225 INJECTION SUBCUTANEOUS
Qty: 1.5 ML | Refills: 11 | Status: SHIPPED | OUTPATIENT
Start: 2025-08-18

## 2025-08-18 RX ORDER — ACETAZOLAMIDE 250 MG/1
750 TABLET ORAL 2 TIMES DAILY
Qty: 540 TABLET | Refills: 3 | Status: SHIPPED | OUTPATIENT
Start: 2025-08-18

## 2025-08-26 ENCOUNTER — OFFICE VISIT (OUTPATIENT)
Dept: OBGYN CLINIC | Age: 40
End: 2025-08-26
Payer: COMMERCIAL

## 2025-08-26 ENCOUNTER — HOSPITAL ENCOUNTER (OUTPATIENT)
Dept: NUTRITION | Age: 40
Discharge: HOME | End: 2025-08-29
Payer: COMMERCIAL

## 2025-08-26 ENCOUNTER — PROCEDURE VISIT (OUTPATIENT)
Dept: OBGYN CLINIC | Age: 40
End: 2025-08-26
Payer: COMMERCIAL

## 2025-08-26 VITALS
BODY MASS INDEX: 29.4 KG/M2 | SYSTOLIC BLOOD PRESSURE: 110 MMHG | HEIGHT: 68 IN | DIASTOLIC BLOOD PRESSURE: 62 MMHG | WEIGHT: 194 LBS

## 2025-08-26 DIAGNOSIS — Z91.89 AT HIGH RISK FOR BREAST CANCER: Primary | ICD-10-CM

## 2025-08-26 DIAGNOSIS — N92.1 MENORRHAGIA WITH IRREGULAR CYCLE: ICD-10-CM

## 2025-08-26 DIAGNOSIS — N94.6 DYSMENORRHEA: ICD-10-CM

## 2025-08-26 DIAGNOSIS — N94.89 PELVIC CONGESTION: Primary | ICD-10-CM

## 2025-08-26 DIAGNOSIS — N94.6 DYSMENORRHEA: Primary | ICD-10-CM

## 2025-08-26 DIAGNOSIS — N94.10 DYSPAREUNIA IN FEMALE: ICD-10-CM

## 2025-08-26 PROCEDURE — 97803 MED NUTRITION INDIV SUBSEQ: CPT

## 2025-08-26 PROCEDURE — 99213 OFFICE O/P EST LOW 20 MIN: CPT | Performed by: OBSTETRICS & GYNECOLOGY

## 2025-08-26 PROCEDURE — 76830 TRANSVAGINAL US NON-OB: CPT | Performed by: OBSTETRICS & GYNECOLOGY

## 2025-08-26 RX ORDER — DROSPIRENONE 4 MG/1
1 TABLET, FILM COATED ORAL DAILY
Qty: 28 TABLET | Refills: 12 | Status: SHIPPED | OUTPATIENT
Start: 2025-08-26

## 2025-08-26 RX ORDER — NAPROXEN SODIUM 550 MG/1
550 TABLET ORAL
Qty: 60 TABLET | Refills: 3 | Status: SHIPPED | OUTPATIENT
Start: 2025-08-26